# Patient Record
Sex: FEMALE | Race: WHITE | NOT HISPANIC OR LATINO | Employment: OTHER | URBAN - NONMETROPOLITAN AREA
[De-identification: names, ages, dates, MRNs, and addresses within clinical notes are randomized per-mention and may not be internally consistent; named-entity substitution may affect disease eponyms.]

---

## 2017-06-12 ENCOUNTER — HISTORY (OUTPATIENT)
Dept: RADIOLOGY | Facility: OTHER | Age: 62
End: 2017-06-12

## 2017-06-12 ENCOUNTER — HOSPITAL ENCOUNTER (OUTPATIENT)
Dept: RADIOLOGY | Facility: OTHER | Age: 62
End: 2017-06-12
Attending: FAMILY MEDICINE

## 2017-06-12 DIAGNOSIS — Z12.31 ENCOUNTER FOR SCREENING MAMMOGRAM FOR MALIGNANT NEOPLASM OF BREAST: ICD-10-CM

## 2017-12-27 NOTE — PROGRESS NOTES
Patient Information     Patient Name MRN Sex Margi Ventura [id number] Female 1955      Progress Notes by Ilsa Johnson at 2017  9:15 AM     Author:  Ilsa Johnson Service:  (none) Author Type:  (none)     Filed:  2017  9:16 AM Date of Service:  2017  9:15 AM Status:  Signed     :  Ilsa Johnson            Falls Risk Criteria:    Age 65 and older or under age 4        Sensory deficits    Poor vision    Use of ambulatory aides    Impaired judgment    Unable to walk independently    Meets High Risk criteria for falls:  no

## 2018-02-27 ENCOUNTER — DOCUMENTATION ONLY (OUTPATIENT)
Dept: FAMILY MEDICINE | Facility: OTHER | Age: 63
End: 2018-02-27

## 2018-02-27 PROBLEM — Z83.3 FAMILY HISTORY OF DIABETES MELLITUS: Status: ACTIVE | Noted: 2018-02-27

## 2018-02-27 PROBLEM — M79.609 PAIN IN LIMB: Status: ACTIVE | Noted: 2018-02-27

## 2018-06-28 ENCOUNTER — HOSPITAL ENCOUNTER (OUTPATIENT)
Dept: MAMMOGRAPHY | Facility: OTHER | Age: 63
Discharge: HOME | End: 2018-06-28
Attending: FAMILY MEDICINE | Admitting: FAMILY MEDICINE
Payer: OTHER GOVERNMENT

## 2018-06-28 DIAGNOSIS — Z12.39 SCREENING BREAST EXAMINATION: ICD-10-CM

## 2018-06-28 DIAGNOSIS — Z12.31 VISIT FOR SCREENING MAMMOGRAM: ICD-10-CM

## 2018-06-28 DIAGNOSIS — Z00.00 PREVENTATIVE HEALTH CARE: ICD-10-CM

## 2018-06-28 PROCEDURE — 77067 SCR MAMMO BI INCL CAD: CPT

## 2019-07-15 ENCOUNTER — HOSPITAL ENCOUNTER (OUTPATIENT)
Dept: MAMMOGRAPHY | Facility: OTHER | Age: 64
Discharge: HOME | End: 2019-07-15
Attending: FAMILY MEDICINE | Admitting: FAMILY MEDICINE
Payer: MEDICARE

## 2019-07-15 DIAGNOSIS — Z12.31 VISIT FOR SCREENING MAMMOGRAM: ICD-10-CM

## 2019-07-15 PROCEDURE — 77063 BREAST TOMOSYNTHESIS BI: CPT

## 2020-02-21 NOTE — PROGRESS NOTES
Nursing Notes:   Suzy Muro LPN  2020  9:42 AM  Signed  Patient presents to the clinic today to discuss a colonoscopy.     Med rec complete.  Suzy Bhaskar HOLT.................. 2020 9:20 AM      SUBJECTIVE:   CC:  Margi Fiore is a 64 year old female who presents to clinic today for the following health issues:  Colon cancer screening.    HPI  Margi Fiore is a 64 year old female who presents for discussion of colon cancer screening.  She has not ever had a colonoscopy done.  She states in the past, her insurance would send her Hemoccult cards, these were always negative.  She has a history of constipation.  She used to take magnesium citrate about every other day.  Now takes a powder laxative about every other day.  She doesn't get gassy or bloated.       No Known Allergies  Current Outpatient Medications   Medication     Thyroid (NATURE-THROID) 48.75 MG TABS     No current facility-administered medications for this visit.       Past Medical History:   Diagnosis Date     Breast lump     FNA benign breast mass age early 40's     Hypothyroidism     2014     Personal history of other medical treatment (CODE)      SAB x 3 EAB x 1      History reviewed. No pertinent surgical history.  Family History   Problem Relation Age of Onset     Hypertension Father         Hypertension     Hyperlipidemia Father         Hyperlipidemia,Hyperlipidemia     Heart Disease Father 86        Heart Disease,MI with stents     Other - See Comments Mother         Lung disease     Hypertension Brother         Hypertension     Family History Negative Sister         Good Health     Diabetes Other         Diabetes,Family history - father's side     Breast Cancer No family hx of         Cancer-breast       Review of Systems She wonders about shingles vaccine ; she is not interested in other healthcare maintenance follow-up at this time.  According to her chart, she is on thyroid replacement.    PHQ-2 Score:  "    PHQ-2 ( 1999 Pfizer) 2/24/2020   Q1: Little interest or pleasure in doing things 0   Q2: Feeling down, depressed or hopeless 0   PHQ-2 Score 0         No flowsheet data found.      OBJECTIVE:     /78   Pulse 68   Temp 97.8  F (36.6  C) (Tympanic)   Resp 18   Ht 1.626 m (5' 4\")   Breastfeeding No   BMI 22.66 kg/m    Body mass index is 22.66 kg/m .   Wt Readings from Last 3 Encounters:   06/10/16 59.9 kg (132 lb)   02/11/14 61.2 kg (135 lb)       Physical Exam  Vitals signs and nursing note reviewed.   Constitutional:       Appearance: Normal appearance. She is normal weight.   Neurological:      Mental Status: She is alert.          No results found for any visits on 02/24/20.      ASSESSMENT/PLAN:       ICD-10-CM    1. Screening for colon cancer Z12.11 ABSTRACT COLOGUARD-NO CHARGE            PLAN:  1.  We discussed options related to colon cancer screening.  In discussing Cologuard, she is very interested in pursuing this.  Order for this is placed today.  She is aware that if positive, a screening colonoscopy would then be indicated.  If negative, follow-up screening in 3 years.  2.  Discussed Medicare covered services including vaccinations.      LILIAN AZAR MD  [facility] AND [facility]    This note was created using voice recognition software and was screened for errors in transcription.    "

## 2020-02-24 ENCOUNTER — OFFICE VISIT (OUTPATIENT)
Dept: FAMILY MEDICINE | Facility: OTHER | Age: 65
End: 2020-02-24
Attending: FAMILY MEDICINE
Payer: MEDICARE

## 2020-02-24 VITALS
HEIGHT: 64 IN | TEMPERATURE: 97.8 F | SYSTOLIC BLOOD PRESSURE: 130 MMHG | RESPIRATION RATE: 18 BRPM | HEART RATE: 68 BPM | DIASTOLIC BLOOD PRESSURE: 78 MMHG | BODY MASS INDEX: 22.66 KG/M2

## 2020-02-24 DIAGNOSIS — Z12.11 SCREENING FOR COLON CANCER: Primary | ICD-10-CM

## 2020-02-24 PROCEDURE — G0463 HOSPITAL OUTPT CLINIC VISIT: HCPCS

## 2020-02-24 PROCEDURE — 99213 OFFICE O/P EST LOW 20 MIN: CPT | Performed by: FAMILY MEDICINE

## 2020-02-24 ASSESSMENT — PAIN SCALES - GENERAL: PAINLEVEL: NO PAIN (0)

## 2020-02-24 NOTE — NURSING NOTE
Patient presents to the clinic today to discuss a colonoscopy.     Med rec complete.  Suzy Muro LPN.................. 2/24/2020 9:20 AM

## 2020-02-24 NOTE — PATIENT INSTRUCTIONS
Services Typically covered by Medicare Recommended Completed   Vaccines    Pneumonoccol    Influenza    Hepatitis B (if medium/high risk)     Once for patients after age 65    Yearly  Medium/high risk factors:    End Stage Kidney Disease    Hemophiliacs who received Factor XIII or IX concentrates    Clients of institutions for developmentally disabled    Persons who live in same house as a Hepatitis B carrier    Homosexual men    Illicit injectable drug users    Health care workers     Mammogram Covered: One-time screen between age 35-39, annually for age 40+     Pap and Pelvic Exam Covered: Annually if  high risk,  or childbearing age with abnormal Pap in last 3 years.  Q24 months for all other women     Prostate Cancer Screening    Digital rectal exam    PSA Covered: Annually for all men > age 50     Corolrectal Cancer Screening Screening colonoscopy every 10 years, more often for high risk patients     Diabetes Self-Management Training Requires referral by treating physician for patient with diabetes     Diabetes Screening    Fasting blood sugar or glucose tolerance test   Once yearly, twice yearly if prediabetic     Cardiovascular Screening Blood Tests    Total Cholesterol    HDL    Triglycerides Every 5 years     Medical Nutrition Therapy for Diabetes or Renal Disease Requires referral by treating physician for patient with diabetes or kidney disease     Glaucoma Screening Annually for patients with one of the following risk factors:    Diabetes Mellitus    Family history of Glaucoma    -American age 50 and over    -American age 65 and over     Bone Mass Measurement Every 24 months if one of the following risk factors:    Estrogen deficiency    Vertebral abnormalities on x-ray indicative of Osteoporosis, Osteopenia, or Vertebral fracture    Receiving/expected to receive the equivalent of at least 5 mg of Prednisone per day for > 3 months    Hyperparathyroidism    Patient being monitored for  response to Osteoporosis Therapy     One-time AAA screen  Must be ordered as part of Medicare IPPE   Any patient with a family history of AAA    Males Age 65-75, with history of smoking at least 100 cigarettes in lifetime     Smoking Cessation Counseling Beneficiaries who use tobacco are eligible to receive 2 cessation attempts per year; each attempt includes maximum of 4 sessions     HIV Screening Annually for beneficiaries at increased risk:       Increased risk for HIV infection is defined in the  National Coverage Determinations (NCD) Manual,  Publication 100-03 Sections 190.14 (diagnostic) and 210.7 (screening). See http://www.cms.gov/manuals/downloads/wly802k9_Qhoq9.pdf and http://www.cms.gov/manuals/downloads/rnv990x8_Asjd3.pdf on the Internet.  Three times per pregnancy for beneficiaries who are pregnant.     Future Annual Wellness Visit Annually, for all beneficiaries.

## 2020-03-11 ENCOUNTER — HEALTH MAINTENANCE LETTER (OUTPATIENT)
Age: 65
End: 2020-03-11

## 2020-03-18 LAB — COLOGUARD-ABSTRACT: NEGATIVE

## 2020-07-13 ENCOUNTER — TELEPHONE (OUTPATIENT)
Dept: FAMILY MEDICINE | Facility: OTHER | Age: 65
End: 2020-07-13

## 2020-07-13 DIAGNOSIS — Z78.0 POSTMENOPAUSAL STATUS: Primary | ICD-10-CM

## 2020-07-13 NOTE — TELEPHONE ENCOUNTER
PCJ-Pt wants a bone density test. Can orders be done over the phone or is a visit required. Please call. Thank you.  Nati Dorado

## 2020-07-24 ENCOUNTER — HOSPITAL ENCOUNTER (OUTPATIENT)
Dept: MAMMOGRAPHY | Facility: OTHER | Age: 65
Discharge: HOME | End: 2020-07-24
Attending: FAMILY MEDICINE | Admitting: FAMILY MEDICINE
Payer: MEDICARE

## 2020-07-24 DIAGNOSIS — Z12.31 VISIT FOR SCREENING MAMMOGRAM: ICD-10-CM

## 2020-07-24 PROCEDURE — 77063 BREAST TOMOSYNTHESIS BI: CPT

## 2020-08-05 ENCOUNTER — HOSPITAL ENCOUNTER (OUTPATIENT)
Dept: MAMMOGRAPHY | Facility: OTHER | Age: 65
End: 2020-08-05
Attending: FAMILY MEDICINE
Payer: MEDICARE

## 2020-08-05 DIAGNOSIS — R92.8 ABNORMAL FINDING ON BREAST IMAGING: ICD-10-CM

## 2020-08-05 PROCEDURE — 77066 DX MAMMO INCL CAD BI: CPT

## 2020-08-17 ENCOUNTER — HOSPITAL ENCOUNTER (OUTPATIENT)
Dept: BONE DENSITY | Facility: OTHER | Age: 65
Discharge: HOME | End: 2020-08-17
Attending: FAMILY MEDICINE | Admitting: FAMILY MEDICINE
Payer: MEDICARE

## 2020-08-17 DIAGNOSIS — Z78.0 POSTMENOPAUSAL STATUS: ICD-10-CM

## 2020-08-17 PROBLEM — M85.80 OSTEOPENIA: Status: ACTIVE | Noted: 2020-01-01

## 2020-08-17 PROCEDURE — 77080 DXA BONE DENSITY AXIAL: CPT

## 2020-08-20 ENCOUNTER — MYC MEDICAL ADVICE (OUTPATIENT)
Dept: FAMILY MEDICINE | Facility: OTHER | Age: 65
End: 2020-08-20

## 2021-01-03 ENCOUNTER — HEALTH MAINTENANCE LETTER (OUTPATIENT)
Age: 66
End: 2021-01-03

## 2021-01-13 ENCOUNTER — MYC MEDICAL ADVICE (OUTPATIENT)
Dept: FAMILY MEDICINE | Facility: OTHER | Age: 66
End: 2021-01-13

## 2021-01-15 ENCOUNTER — MYC MEDICAL ADVICE (OUTPATIENT)
Dept: FAMILY MEDICINE | Facility: OTHER | Age: 66
End: 2021-01-15

## 2021-02-27 ENCOUNTER — MYC MEDICAL ADVICE (OUTPATIENT)
Dept: FAMILY MEDICINE | Facility: OTHER | Age: 66
End: 2021-02-27

## 2021-02-27 DIAGNOSIS — Z83.719 FAMILY HISTORY OF COLONIC POLYPS: Primary | ICD-10-CM

## 2021-02-27 NOTE — TELEPHONE ENCOUNTER
Please call patient:  She had negative cologuard testing March 2020.  Follow up colon cancer testing is not due until 2023.  If she is having a change in bowel symptoms, she should be seen first.  Yulia Lopez MD

## 2021-02-27 NOTE — TELEPHONE ENCOUNTER
After proper verification writer spoke with patient who understands that she did receive the negative cologuard but has worries because her father recently had a colonoscopy which included 9 polyps and had a large area of his bowels removed. She states his surgeon recommended she get one. She has not noticed any changes in bowel habits.   Chiquita Lee LPN on 2/27/2021 at 1:53 PM

## 2021-03-01 DIAGNOSIS — Z12.11 ENCOUNTER FOR SCREENING COLONOSCOPY: Primary | ICD-10-CM

## 2021-03-01 RX ORDER — BISACODYL 5 MG/1
TABLET, DELAYED RELEASE ORAL
Qty: 2 TABLET | Refills: 0 | Status: ON HOLD | OUTPATIENT
Start: 2021-03-01 | End: 2021-04-22

## 2021-03-01 RX ORDER — POLYETHYLENE GLYCOL 3350 17 G/17G
POWDER, FOR SOLUTION ORAL
Qty: 510 G | Refills: 0 | Status: ON HOLD | OUTPATIENT
Start: 2021-03-01 | End: 2021-04-22

## 2021-03-01 RX ORDER — BISACODYL 5 MG/1
TABLET, DELAYED RELEASE ORAL
Qty: 2 TABLET | Refills: 0 | Status: CANCELLED | OUTPATIENT
Start: 2021-03-01

## 2021-03-01 RX ORDER — POLYETHYLENE GLYCOL 3350 17 G/17G
POWDER, FOR SOLUTION ORAL
Qty: 255 G | Refills: 0 | Status: CANCELLED | OUTPATIENT
Start: 2021-03-01

## 2021-03-01 NOTE — TELEPHONE ENCOUNTER
Screening Questions for the Scheduling of Screening Colonoscopies   (If Colonoscopy is diagnostic, Provider should review the chart before scheduling.)  Are you younger than 50 or older than 80?  NO  Do you take aspirin or fish oil?  YES (if yes, tell patient to stop 1 week prior to Colonoscopy)  Do you take warfarin (Coumadin), clopidogrel (Plavix), apixaban (Eliquis), dabigatram (Pradaxa), rivaroxaban (Xarelto) or any blood thinner? NO  Do you use oxygen at home?  NO  Do you have kidney disease? NO  Are you on dialysis? NO  Have you had a stroke or heart attack in the last year? NO  Have you had a stent in your heart or any blood vessel in the last year? NO  Have you had a transplant of any organ? NO  Have you had a colonoscopy or upper endoscopy (EGD) before? NO         When?    Date of scheduled Colonoscopy. 04/22/2021  Provider DR. TELLO  Pharmacy [facility]

## 2021-04-18 ENCOUNTER — ALLIED HEALTH/NURSE VISIT (OUTPATIENT)
Dept: FAMILY MEDICINE | Facility: OTHER | Age: 66
End: 2021-04-18
Attending: SURGERY
Payer: MEDICARE

## 2021-04-18 DIAGNOSIS — Z12.11 ENCOUNTER FOR SCREENING COLONOSCOPY: ICD-10-CM

## 2021-04-18 LAB
LABORATORY COMMENT REPORT: NORMAL
SARS-COV-2 RNA RESP QL NAA+PROBE: NEGATIVE
SARS-COV-2 RNA RESP QL NAA+PROBE: NORMAL
SPECIMEN SOURCE: NORMAL
SPECIMEN SOURCE: NORMAL

## 2021-04-18 PROCEDURE — U0005 INFEC AGEN DETEC AMPLI PROBE: HCPCS | Mod: ZL | Performed by: SURGERY

## 2021-04-18 PROCEDURE — U0003 INFECTIOUS AGENT DETECTION BY NUCLEIC ACID (DNA OR RNA); SEVERE ACUTE RESPIRATORY SYNDROME CORONAVIRUS 2 (SARS-COV-2) (CORONAVIRUS DISEASE [COVID-19]), AMPLIFIED PROBE TECHNIQUE, MAKING USE OF HIGH THROUGHPUT TECHNOLOGIES AS DESCRIBED BY CMS-2020-01-R: HCPCS | Mod: ZL | Performed by: SURGERY

## 2021-04-18 PROCEDURE — C9803 HOPD COVID-19 SPEC COLLECT: HCPCS

## 2021-04-22 ENCOUNTER — HOSPITAL ENCOUNTER (OUTPATIENT)
Facility: OTHER | Age: 66
Discharge: HOME | End: 2021-04-22
Attending: SURGERY | Admitting: SURGERY
Payer: MEDICARE

## 2021-04-22 ENCOUNTER — ANESTHESIA EVENT (OUTPATIENT)
Dept: SURGERY | Facility: OTHER | Age: 66
End: 2021-04-22
Payer: MEDICARE

## 2021-04-22 ENCOUNTER — ANESTHESIA (OUTPATIENT)
Dept: SURGERY | Facility: OTHER | Age: 66
End: 2021-04-22
Payer: MEDICARE

## 2021-04-22 VITALS
OXYGEN SATURATION: 96 % | WEIGHT: 132 LBS | TEMPERATURE: 98.2 F | SYSTOLIC BLOOD PRESSURE: 121 MMHG | DIASTOLIC BLOOD PRESSURE: 77 MMHG | BODY MASS INDEX: 22.53 KG/M2 | HEIGHT: 64 IN | HEART RATE: 68 BPM

## 2021-04-22 PROCEDURE — 88305 TISSUE EXAM BY PATHOLOGIST: CPT

## 2021-04-22 PROCEDURE — 250N000009 HC RX 250: Performed by: SURGERY

## 2021-04-22 PROCEDURE — 45385 COLONOSCOPY W/LESION REMOVAL: CPT | Performed by: NURSE ANESTHETIST, CERTIFIED REGISTERED

## 2021-04-22 PROCEDURE — 45385 COLONOSCOPY W/LESION REMOVAL: CPT | Mod: PT | Performed by: SURGERY

## 2021-04-22 PROCEDURE — 45380 COLONOSCOPY AND BIOPSY: CPT | Performed by: SURGERY

## 2021-04-22 PROCEDURE — 250N000011 HC RX IP 250 OP 636: Performed by: NURSE ANESTHETIST, CERTIFIED REGISTERED

## 2021-04-22 PROCEDURE — 258N000003 HC RX IP 258 OP 636: Performed by: SURGERY

## 2021-04-22 PROCEDURE — 250N000009 HC RX 250: Performed by: NURSE ANESTHETIST, CERTIFIED REGISTERED

## 2021-04-22 RX ORDER — PROPOFOL 10 MG/ML
INJECTION, EMULSION INTRAVENOUS CONTINUOUS PRN
Status: DISCONTINUED | OUTPATIENT
Start: 2021-04-22 | End: 2021-04-22

## 2021-04-22 RX ORDER — LIDOCAINE 40 MG/G
CREAM TOPICAL
Status: DISCONTINUED | OUTPATIENT
Start: 2021-04-22 | End: 2021-04-22 | Stop reason: HOSPADM

## 2021-04-22 RX ORDER — NALOXONE HYDROCHLORIDE 0.4 MG/ML
0.4 INJECTION, SOLUTION INTRAMUSCULAR; INTRAVENOUS; SUBCUTANEOUS
Status: DISCONTINUED | OUTPATIENT
Start: 2021-04-22 | End: 2021-04-22 | Stop reason: HOSPADM

## 2021-04-22 RX ORDER — FLUMAZENIL 0.1 MG/ML
0.2 INJECTION, SOLUTION INTRAVENOUS
Status: DISCONTINUED | OUTPATIENT
Start: 2021-04-22 | End: 2021-04-22 | Stop reason: HOSPADM

## 2021-04-22 RX ORDER — NALOXONE HYDROCHLORIDE 0.4 MG/ML
0.2 INJECTION, SOLUTION INTRAMUSCULAR; INTRAVENOUS; SUBCUTANEOUS
Status: DISCONTINUED | OUTPATIENT
Start: 2021-04-22 | End: 2021-04-22 | Stop reason: HOSPADM

## 2021-04-22 RX ORDER — PROPOFOL 10 MG/ML
INJECTION, EMULSION INTRAVENOUS PRN
Status: DISCONTINUED | OUTPATIENT
Start: 2021-04-22 | End: 2021-04-22

## 2021-04-22 RX ORDER — SODIUM CHLORIDE, SODIUM LACTATE, POTASSIUM CHLORIDE, CALCIUM CHLORIDE 600; 310; 30; 20 MG/100ML; MG/100ML; MG/100ML; MG/100ML
INJECTION, SOLUTION INTRAVENOUS CONTINUOUS
Status: DISCONTINUED | OUTPATIENT
Start: 2021-04-22 | End: 2021-04-22 | Stop reason: HOSPADM

## 2021-04-22 RX ORDER — ONDANSETRON 2 MG/ML
4 INJECTION INTRAMUSCULAR; INTRAVENOUS
Status: DISCONTINUED | OUTPATIENT
Start: 2021-04-22 | End: 2021-04-22 | Stop reason: HOSPADM

## 2021-04-22 RX ORDER — LIDOCAINE HYDROCHLORIDE 20 MG/ML
INJECTION, SOLUTION INFILTRATION; PERINEURAL PRN
Status: DISCONTINUED | OUTPATIENT
Start: 2021-04-22 | End: 2021-04-22

## 2021-04-22 RX ADMIN — PROPOFOL 140 MCG/KG/MIN: 10 INJECTION, EMULSION INTRAVENOUS at 08:15

## 2021-04-22 RX ADMIN — PROPOFOL 100 MG: 10 INJECTION, EMULSION INTRAVENOUS at 08:15

## 2021-04-22 RX ADMIN — SODIUM CHLORIDE, POTASSIUM CHLORIDE, SODIUM LACTATE AND CALCIUM CHLORIDE: 600; 310; 30; 20 INJECTION, SOLUTION INTRAVENOUS at 08:00

## 2021-04-22 RX ADMIN — LIDOCAINE HYDROCHLORIDE 40 MG: 20 INJECTION, SOLUTION INFILTRATION; PERINEURAL at 08:15

## 2021-04-22 ASSESSMENT — MIFFLIN-ST. JEOR: SCORE: 1123.75

## 2021-04-22 NOTE — ANESTHESIA POSTPROCEDURE EVALUATION
Patient: Margi Fiore    Procedure(s):  COLONOSCOPY, WITH POLYPECTOMY    Diagnosis:History of colon polyps [Z86.010]  Diagnosis Additional Information: No value filed.    Anesthesia Type:  MAC    Note:  Disposition: Outpatient   Postop Pain Control: Uneventful            Sign Out: Well controlled pain   PONV: No   Neuro/Psych: Uneventful            Sign Out: Acceptable/Baseline neuro status   Airway/Respiratory: Uneventful            Sign Out: Acceptable/Baseline resp. status   CV/Hemodynamics: Uneventful            Sign Out: Acceptable CV status; No obvious hypovolemia; No obvious fluid overload   Other NRE: NONE   DID A NON-ROUTINE EVENT OCCUR? No           Last vitals:  Vitals:    04/22/21 0849 04/22/21 0900 04/22/21 0915   BP: 95/62 101/61 121/77   Pulse: 66 62 68   Temp:      SpO2:  97% 96%       Last vitals prior to Anesthesia Care Transfer:  CRNA VITALS  4/22/2021 0819 - 4/22/2021 0919      4/22/2021             Resp Rate (set):  10          Electronically Signed By: EDWARD Kuhn CRNA  April 22, 2021  10:09 AM

## 2021-04-22 NOTE — OP NOTE
PROCEDURE NOTE    SURGEON:Dominguez Beltran MD    PRE-OP DIAGNOSIS:  Screening Colonoscopy      POST-OP DIAGNOSIS: Colon polyps, diverticuli    PROCEDURE:  Colonoscopy with cold snare    SPECIMEN:    ID Type Source Tests Collected by Time Destination   A :  Polyp Large Intestine, Right/Ascending SURGICAL PATHOLOGY EXAM Dominguez Beltran MD 4/22/2021  8:28 AM    B : sigmoid colon polyp Polyp Large Intestine, Sigmoid SURGICAL PATHOLOGY EXAM Dominguez Beltran MD 4/22/2021  8:37 AM          ANESTHESIA:  Monitor Anesthesia Care CRNA Independent: Fransisco Morrell APRN CRNA   Coverage requested    ESTIMATED BLOOD LOSS: none    COMPLICATIONS:  None    INDICATION FOR THE PROCEDURE: The patient is a 65 year old female. The patient presents with family hx of colon cancer. I explained to the patient the risks, benefits and alternatives to screening colonoscopy for evaluating for cancer or polyps. We discussed the risks including bleeding, perforation, potential inability to reach the cecum and the risks of sedation. The patient's questions were answered and the patient wished to proceed. Informed consent paperwork was completed.    PROCEDURE: The patient was taken to the endoscopy suite. Appropriate monitors were attached. The patient was placed in the left lateral decubitus position. Timeout was performed confirming the patient's identity and procedure to be performed.  After appropriate sedation was confirmed, digital rectal exam was performed.  There was normal tone and no gross abnormality was noted.  The lubricated colonoscope was introduced into the anus the colon was insufflated with air. The prep quality was adequate. Under direct visualization the scope was advanced to the cecum. The mucosa of the colon was inspected while withdrawing the scope. A 6 mm ascending colon polyp and two 5 mm sigmoid colon polyps were removed with cold snare.  The scope was retroflexed in the rectum and the anorectal junction was inspected. No  abnormalities were noted. The scope was returned to aneutral position and the colon was decompressed. The scope was removed. The patient tolerated the procedure with no immediately apparent complication. The patient was taken to recovery in stable condition.    FOLLOW UP: RECOMMEND high fiber diet, will call with pathology results.     Dominguez Beltran MD on 4/22/2021 at 8:44 AM

## 2021-04-22 NOTE — ANESTHESIA PREPROCEDURE EVALUATION
Anesthesia Pre-Procedure Evaluation    Patient: Margi Fiore   MRN: [id number] : 1955        Preoperative Diagnosis: History of colon polyps [Z86.010]   Procedure : Procedure(s):  COLONOSCOPY     Past Medical History:   Diagnosis Date     Breast lump     FNA benign breast mass age early 40's     Hypothyroidism     2014     Osteopenia 2020    Dexa      Personal history of other medical treatment (CODE)      SAB x 3 EAB x 1      History reviewed. No pertinent surgical history.   No Known Allergies   Social History     Tobacco Use     Smoking status: Never Smoker     Smokeless tobacco: Never Used   Substance Use Topics     Alcohol use: No      Wt Readings from Last 1 Encounters:   21 59.9 kg (132 lb)        Anesthesia Evaluation   Pt has had prior anesthetic.     No history of anesthetic complications       ROS/MED HX  ENT/Pulmonary:  - neg pulmonary ROS     Neurologic:  - neg neurologic ROS     Cardiovascular:  - neg cardiovascular ROS     METS/Exercise Tolerance: >4 METS    Hematologic:  - neg hematologic  ROS     Musculoskeletal:  - neg musculoskeletal ROS     GI/Hepatic:  - neg GI/hepatic ROS     Renal/Genitourinary:  - neg Renal ROS     Endo:     (+) thyroid problem, hypothyroidism,     Psychiatric/Substance Use:  - neg psychiatric ROS     Infectious Disease:  - neg infectious disease ROS     Malignancy:  - neg malignancy ROS     Other:  - neg other ROS          Physical Exam    Airway        Mallampati: I   TM distance: > 3 FB   Neck ROM: full   Mouth opening: > 3 cm    Respiratory Devices and Support         Dental  no notable dental history         Cardiovascular   cardiovascular exam normal       Rhythm and rate: regular and normal     Pulmonary   pulmonary exam normal        breath sounds clear to auscultation           OUTSIDE LABS:  CBC: No results found for: WBC, HGB, HCT, PLT  BMP: No results found for: NA, POTASSIUM, CHLORIDE, CO2, BUN, CR, GLC  COAGS: No results found  for: PTT, INR, FIBR  POC: No results found for: BGM, HCG, HCGS  HEPATIC: No results found for: ALBUMIN, PROTTOTAL, ALT, AST, GGT, ALKPHOS, BILITOTAL, BILIDIRECT, KHADRA  OTHER: No results found for: PH, LACT, A1C, EFRAIN, PHOS, MAG, LIPASE, AMYLASE, TSH, T4, T3, CRP, SED    Anesthesia Plan    ASA Status:  2   NPO Status:  NPO Appropriate    Anesthesia Type: MAC.   Induction: Intravenous.           Consents    Anesthesia Plan(s) and associated risks, benefits, and realistic alternatives discussed. Questions answered and patient/representative(s) expressed understanding.     - Discussed with:  Patient         Postoperative Care            Comments:                EDWARD LUA CRNA

## 2021-04-22 NOTE — OR NURSING
patient has been discharged to home at 0935 via ambulatory accompanied by dad    Written discharge instructions were provided to patient.  Prescriptions were none.      Patient and adult caring for them verbalize understanding of discharge instructions including no driving until tomorrow and no longer taking narcotic pain medications - no operating mechanical equipment and no making any important decisions.They understand reason for discharge, and necessary follow-up appointments.

## 2021-04-22 NOTE — DISCHARGE INSTRUCTIONS
[facility]  Adult Discharge Orders & Instructions    ________________________________________________________________          For 12 hours after surgery  1. Get plenty of rest.  A responsible adult must stay with you for at least 12 hours after you leave the hospital.   2. You may feel lightheaded.  IF so, sit for a few minutes before standing.  Have someone help you get up.   3. You may have a slight fever. Call the doctor if your fever is over 101 F (38.3 C) (taken under the tongue) or lasts longer than 24 hours.  4. You may have a dry mouth, a sore throat, muscle aches or trouble sleeping.  These should go away after 24 hours.  5. Do not make important or legal decisions.  6.   Do not drive or use heavy equipment.  If you have weakness or tingling, don't drive or use heavy equipment until this feeling goes away.    To contact a doctor, call   [phone]_______________________

## 2021-04-22 NOTE — ANESTHESIA CARE TRANSFER NOTE
Patient: Margi Fiore    Procedure(s):  COLONOSCOPY, WITH POLYPECTOMY    Diagnosis: History of colon polyps [Z86.010]  Diagnosis Additional Information: No value filed.    Anesthesia Type:   MAC     Note:    Oropharynx: oropharynx clear of all foreign objects  Level of Consciousness: drowsy  Oxygen Supplementation: nasal cannula  Level of Supplemental Oxygen (L/min / FiO2): 2  Independent Airway: airway patency satisfactory and stable  Dentition: dentition unchanged  Vital Signs Stable: post-procedure vital signs reviewed and stable  Report to RN Given: handoff report given  Patient transferred to: Phase II    Handoff Report: Identifed the Patient, Identified the Reponsible Provider, Reviewed the pertinent medical history, Discussed the surgical course, Reviewed Intra-OP anesthesia mangement and issues during anesthesia, Set expectations for post-procedure period and Allowed opportunity for questions and acknowledgement of understanding      Vitals: (Last set prior to Anesthesia Care Transfer)  CRNA VITALS  4/22/2021 0819 - 4/22/2021 0849      4/22/2021             Resp Rate (set):  10        Electronically Signed By: EDWARD Kuhn CRNA  April 22, 2021  8:49 AM

## 2021-04-22 NOTE — H&P
PRE-PROCEDURE NOTE    CHIEFCOMPLAINT / REASON FOR PROCEDURE:  Screening for polyps and colorectal cancer.    PERTINENT HISTORY   Patient is due for colonoscopy. Hx of polyps.    Past Medical History:   Diagnosis Date     Breast lump     FNA benign breast mass age early 40's     Hypothyroidism     2014     Osteopenia     Dexa      Personal history of other medical treatment (CODE)      SAB x 3 EAB x 1       History reviewed. No pertinent surgical history.      Other:  None  Bleeding tendencies: No     Relevant Family History:  None     Relevant Social History:  None     10 point ROS of systems including Constitutional, Eyes, Respiratory, Cardiovascular, Gastroenterology, Genitourinary, Integumentary, Muscularskeletal, Psychiatric were all negative except for pertinent positives noted in my HPI.      ALLERGIES/SENSITIVITIES: No Known Allergies     CURRENT MEDICATIONS:    No current facility-administered medications on file prior to encounter.   NATURE-THROID 65 MG tablet,   Thyroid (NATURE-THROID) 48.75 MG TABS, Take 1 tablet by mouth daily            PRE-SEDATION ASSESSMENT:    LUNGS:  CTA B/L, no wheezing or crackles.  Heart & CV:  RRR no murmur.  Intact distal pulses, good cap refill.    Comment(s):      IMPRESSION: 65 year old female in need of screening colonoscopy.    PLAN:  I discussed screening colonoscopy with the patient. Anesthesia coverage requested.    Dominguez Beltran MD    2021 8:00 AM

## 2021-04-25 ENCOUNTER — HEALTH MAINTENANCE LETTER (OUTPATIENT)
Age: 66
End: 2021-04-25

## 2021-07-26 ENCOUNTER — HOSPITAL ENCOUNTER (OUTPATIENT)
Dept: MAMMOGRAPHY | Facility: OTHER | Age: 66
Discharge: HOME | End: 2021-07-26
Attending: FAMILY MEDICINE | Admitting: FAMILY MEDICINE
Payer: MEDICARE

## 2021-07-26 DIAGNOSIS — Z12.31 VISIT FOR SCREENING MAMMOGRAM: ICD-10-CM

## 2021-07-26 PROCEDURE — 77063 BREAST TOMOSYNTHESIS BI: CPT

## 2021-10-09 ENCOUNTER — HEALTH MAINTENANCE LETTER (OUTPATIENT)
Age: 66
End: 2021-10-09

## 2021-10-10 ENCOUNTER — MYC MEDICAL ADVICE (OUTPATIENT)
Dept: FAMILY MEDICINE | Facility: OTHER | Age: 66
End: 2021-10-10

## 2022-02-07 ENCOUNTER — OFFICE VISIT (OUTPATIENT)
Dept: FAMILY MEDICINE | Facility: OTHER | Age: 67
End: 2022-02-07
Attending: FAMILY MEDICINE
Payer: MEDICARE

## 2022-02-07 VITALS
OXYGEN SATURATION: 98 % | RESPIRATION RATE: 16 BRPM | DIASTOLIC BLOOD PRESSURE: 80 MMHG | TEMPERATURE: 98.7 F | SYSTOLIC BLOOD PRESSURE: 132 MMHG | HEART RATE: 75 BPM

## 2022-02-07 DIAGNOSIS — B02.9 HERPES ZOSTER WITHOUT COMPLICATION: Primary | ICD-10-CM

## 2022-02-07 PROCEDURE — 99213 OFFICE O/P EST LOW 20 MIN: CPT | Performed by: FAMILY MEDICINE

## 2022-02-07 PROCEDURE — G0463 HOSPITAL OUTPT CLINIC VISIT: HCPCS

## 2022-02-07 RX ORDER — ASPIRIN 81 MG/1
81 TABLET ORAL DAILY
COMMUNITY

## 2022-02-07 RX ORDER — VALACYCLOVIR HYDROCHLORIDE 1 G/1
1000 TABLET, FILM COATED ORAL 3 TIMES DAILY
Qty: 21 TABLET | Refills: 0 | Status: SHIPPED | OUTPATIENT
Start: 2022-02-07 | End: 2022-03-13

## 2022-02-07 ASSESSMENT — PAIN SCALES - GENERAL: PAINLEVEL: NO PAIN (0)

## 2022-02-07 NOTE — NURSING NOTE
"Chief Complaint   Patient presents with     Derm Problem     Patient is here for a rash- possible shingles     Initial /80 (BP Location: Right arm, Patient Position: Sitting, Cuff Size: Adult Regular)   Pulse 75   Temp 98.7  F (37.1  C) (Tympanic)   Resp 16   LMP  (LMP Unknown)   SpO2 98%   Breastfeeding No  Estimated body mass index is 22.66 kg/m  as calculated from the following:    Height as of 4/22/21: 1.626 m (5' 4\").    Weight as of 4/22/21: 59.9 kg (132 lb).  Medication Reconciliation: complete    Agustina Knowles MA       FOOD SECURITY SCREENING QUESTIONS:    The next two questions are to help us understand your food security.  If you are feeling you need any assistance in this area, we have resources available to support you today.    Hunger Vital Signs:  Within the past 12 months we worried whether our food would run out before we got money to buy more. Never  Within the past 12 months the food we bought just didn't last and we didn't have money to get more. Never  Agustina Knowles MA,LPN on 2/7/2022 at 3:58 PM      "

## 2022-02-07 NOTE — PROGRESS NOTES
"ASSESSMENT/PLAN:     1. Herpes zoster without complication      1. Patient with classic herpes zoster rash. She is aware that there are antivirals that she can start taking at this time and she would like to proceed with this. Prescription for Valtrex 500 mg 3 times daily x7 days is sent into her pharmacy. We discussed over-the-counter analgesics, possible topical agents I can help decrease/manage symptoms as well. Discussed risk factors for postherpetic neuralgia. We also discussed contagious nature of outbreak, in particular to whom she is most likely to be contagious.    Follow-up as needed    Assessment & Plan   Problem List Items Addressed This Visit     None      Visit Diagnoses     Herpes zoster without complication    -  Primary    Relevant Medications    valACYclovir (VALTREX) 1000 mg tablet                PDMP Review     None            LILIAN AZAR MD, FAAFP  [facility]      NURSING NOTES:  Nursing Notes:   Agustina Knowles MA  2/7/2022  4:25 PM  Signed  Chief Complaint   Patient presents with     Derm Problem     Patient is here for a rash- possible shingles     Initial /80 (BP Location: Right arm, Patient Position: Sitting, Cuff Size: Adult Regular)   Pulse 75   Temp 98.7  F (37.1  C) (Tympanic)   Resp 16   LMP  (LMP Unknown)   SpO2 98%   Breastfeeding No  Estimated body mass index is 22.66 kg/m  as calculated from the following:    Height as of 4/22/21: 1.626 m (5' 4\").    Weight as of 4/22/21: 59.9 kg (132 lb).  Medication Reconciliation: complete    Agustina Knowles MA       FOOD SECURITY SCREENING QUESTIONS:    The next two questions are to help us understand your food security.  If you are feeling you need any assistance in this area, we have resources available to support you today.    Hunger Vital Signs:  Within the past 12 months we worried whether our food would run out before we got money to buy more. Never  Within the past 12 months the food we " bought just didn't last and we didn't have money to get more. Never  Agustina Knowles MA,LPN on 2022 at 3:58 PM           SUBJECTIVE:    Margi Fiore is a 66 year old female  who presents for the following health issues:  Possible shingles     HPI  Margi Fiore is a 66 year old female presents for possible shingles.   Starting having symptoms 4 days ago.  It felt like a burn on her left chest   She broke out in a rash today. Now the area does not seem to be as sore or tender. No chills or fevers with this. She did remove her bra today as having that on seem to irritate her skin. She is not immunosuppressed.    She hasn't had shingles vaccination.        No Known Allergies  Current Outpatient Medications   Medication     aspirin 81 MG EC tablet     UNKNOWN TO PATIENT     valACYclovir (VALTREX) 1000 mg tablet     No current facility-administered medications for this visit.      Past Medical History:   Diagnosis Date     Breast lump     FNA benign breast mass age early 40's     Hypothyroidism     2014     Osteopenia 2020    Dexa      Personal history of other medical treatment (CODE)      SAB x 3 EAB x 1      Past Surgical History:   Procedure Laterality Date     COLONOSCOPY N/A 2021    Procedure: COLONOSCOPY, WITH POLYPECTOMY;  Surgeon: Dominguez Beltran MD;  Location:  OR       Review of Systems     PHQ-2 Score:     PHQ-2 (  [employer]) 2022   Q1: Little interest or pleasure in doing things 0 0   Q2: Feeling down, depressed or hopeless 0 0   PHQ-2 Score 0 0   PHQ-2 Total Score (12-17 Years)- Positive if 3 or more points; Administer PHQ-A if positive - 0   Q1: Little interest or pleasure in doing things Not at all -   Q2: Feeling down, depressed or hopeless Not at all -   PHQ-2 Score 0 -         No flowsheet data found.  No flowsheet data found.        OBJECTIVE:     Objective  /80 (BP Location: Right arm, Patient Position: Sitting, Cuff Size: Adult Regular)    Pulse 75   Temp 98.7  F (37.1  C) (Tympanic)   Resp 16   LMP  (LMP Unknown)   SpO2 98%   Breastfeeding No  There is no height or weight on file to calculate BMI.    Wt Readings from Last 4 Encounters:   04/22/21 59.9 kg (132 lb)   06/10/16 59.9 kg (132 lb)   02/11/14 61.2 kg (135 lb)       Nursing notes and VS reviewed    Physical Exam   GENERAL: healthy, alert and no distress  SKIN: Vesicular rash in left T-8 distribution    - no drainage, mildly tender           No results found for any visits on 02/07/22.              Answers for HPI/ROS submitted by the patient on 2/7/2022  How many servings of fruits and vegetables do you eat daily?: 2-3  On average, how many sweetened beverages do you drink each day (Examples: soda, juice, sweet tea, etc.  Do NOT count diet or artificially sweetened beverages)?: 0  How many minutes a day do you exercise enough to make your heart beat faster?: 30 to 60  How many days a week do you exercise enough to make your heart beat faster?: 3 or less  How many days per week do you miss taking your medication?: 0

## 2022-02-07 NOTE — PATIENT INSTRUCTIONS
Calamine lotion   Aloe vera gel         Valtrex -anti-viral medication prescription       Patient Education     Shingles  Shingles is a viral infection caused by the same virus that causes chicken pox. Anyone who has had chicken pox may get shingles later in life. The virus stays in the body, but remains asleep (dormant). Shingles often occurs in older persons or persons with lowered immunity. But it can affect anyone at any age.  Shingles starts as a tingling patch of skin on one side of the body. Small, painful blisters may then appear. The rash rarely spreads to other parts of the body.  Exposure to shingles can't cause shingles. However, it can cause chicken pox in anyone who has not had chicken pox or has not been vaccinated. The contagious period ends when all blisters have crusted over, generally 1 to 2 weeks after the illness starts.  After the blisters heal, the affected skin may be sensitive or painful for weeks or months, gradually resolving over time. But, sometimes this can last longer and be permanent (called postherpetic neuralgia.)  Shingles vaccines are available. Vaccination can help prevent shingles or make it less painful. It is generally recommended for adults older than 50, even if you've had singles in the past. Talk with your healthcare provider about when to get vaccinated and which vaccine is best for you.  Home care    Medicines may be prescribed to help relieve pain. Take these medicines as directed. Ask your healthcare provider or pharmacist before using over-the-counter medicines for helping treat pain and itching.    In certain cases, antiviral medicines may be prescribed to reduce pain, shorten the illness, and prevent neuralgia. Take these medicines as directed.    Compresses made from a solution of cool water mixed with cornstarch or baking soda may help relieve pain and itching.     Gently wash skin daily with soap and water to help prevent infection. Be certain to rinse off all of  the soap, which can be irritating.    Trim fingernails and try not to scratch. Scratching the sores may leave scars.    Stay home from work or school until all blisters have formed a crust and you are no longer contagious.  Follow-up care  Follow up with your healthcare provider, or as directed.  When to seek medical advice    Fever of 100.4 F (38 C) or higher, or as directed by your healthcare provider    Affected skin is on the face or neck and any of the following occur:  ? Headache  ? Eye pain  ? Changes in vision  ? Sores near the eye  ? Weakness of facial muscles    Blisters occurring on new areas of the body    Pain, redness, or swelling of a joint    Signs of skin infection: colored drainage from the sores, warmth, increasing redness, fever, or increasing pain  Agustin last reviewed this educational content on 4/1/2018 2000-2021 [employer]. All rights reserved. This information is not intended as a substitute for professional medical care. Always follow your healthcare professional's instructions.

## 2022-03-11 ENCOUNTER — MYC MEDICAL ADVICE (OUTPATIENT)
Dept: FAMILY MEDICINE | Facility: OTHER | Age: 67
End: 2022-03-11
Payer: OTHER GOVERNMENT

## 2022-03-11 DIAGNOSIS — B02.9 HERPES ZOSTER WITHOUT COMPLICATION: ICD-10-CM

## 2022-03-11 RX ORDER — VALACYCLOVIR HYDROCHLORIDE 1 G/1
1000 TABLET, FILM COATED ORAL 3 TIMES DAILY
Qty: 21 TABLET | Refills: 0 | Status: CANCELLED | OUTPATIENT
Start: 2022-03-11

## 2022-03-11 NOTE — TELEPHONE ENCOUNTER
Pending Prescriptions:                       Disp   Refills    valACYclovir (VALTREX) 1000 mg tablet     21 tab*0            Sig: Take 1 tablet (1,000 mg) by mouth 3 times daily

## 2022-03-13 NOTE — TELEPHONE ENCOUNTER
Call patient - additional antiviral medication is not helpful.  Medication that is available over the counter such as capsaicin cream and Voltaren gel can be helpful.  There are prescription medications that could be used too. If she would like a prescription medication she should set up an e-visit or in person visit.  Yulia Lopez MD

## 2022-05-21 ENCOUNTER — HEALTH MAINTENANCE LETTER (OUTPATIENT)
Age: 67
End: 2022-05-21

## 2022-07-29 ENCOUNTER — HOSPITAL ENCOUNTER (OUTPATIENT)
Dept: MAMMOGRAPHY | Facility: OTHER | Age: 67
Discharge: HOME | End: 2022-07-29
Attending: FAMILY MEDICINE | Admitting: FAMILY MEDICINE
Payer: MEDICARE

## 2022-07-29 DIAGNOSIS — Z12.31 VISIT FOR SCREENING MAMMOGRAM: ICD-10-CM

## 2022-07-29 PROCEDURE — 77067 SCR MAMMO BI INCL CAD: CPT

## 2022-08-22 ENCOUNTER — HOSPITAL ENCOUNTER (OUTPATIENT)
Dept: MAMMOGRAPHY | Facility: OTHER | Age: 67
Discharge: HOME | End: 2022-08-22
Attending: FAMILY MEDICINE
Payer: MEDICARE

## 2022-08-22 ENCOUNTER — HOSPITAL ENCOUNTER (OUTPATIENT)
Dept: ULTRASOUND IMAGING | Facility: OTHER | Age: 67
Discharge: HOME | End: 2022-08-22
Attending: FAMILY MEDICINE
Payer: MEDICARE

## 2022-08-22 DIAGNOSIS — R92.8 ABNORMAL FINDING ON BREAST IMAGING: ICD-10-CM

## 2022-08-22 PROCEDURE — 76642 ULTRASOUND BREAST LIMITED: CPT | Mod: RT

## 2022-08-22 PROCEDURE — 77061 BREAST TOMOSYNTHESIS UNI: CPT | Mod: RT

## 2022-09-17 ENCOUNTER — HEALTH MAINTENANCE LETTER (OUTPATIENT)
Age: 67
End: 2022-09-17

## 2023-06-04 ENCOUNTER — HEALTH MAINTENANCE LETTER (OUTPATIENT)
Age: 68
End: 2023-06-04

## 2023-11-10 ENCOUNTER — HOSPITAL ENCOUNTER (OUTPATIENT)
Dept: MAMMOGRAPHY | Facility: OTHER | Age: 68
Discharge: HOME | End: 2023-11-10
Attending: FAMILY MEDICINE | Admitting: FAMILY MEDICINE
Payer: MEDICARE

## 2023-11-10 DIAGNOSIS — Z12.31 VISIT FOR SCREENING MAMMOGRAM: ICD-10-CM

## 2023-11-10 PROCEDURE — 77067 SCR MAMMO BI INCL CAD: CPT

## 2024-07-13 ENCOUNTER — HEALTH MAINTENANCE LETTER (OUTPATIENT)
Age: 69
End: 2024-07-13

## 2024-07-15 ENCOUNTER — NURSE TRIAGE (OUTPATIENT)
Dept: NURSING | Facility: CLINIC | Age: 69
End: 2024-07-15
Payer: OTHER GOVERNMENT

## 2024-07-15 ENCOUNTER — HOSPITAL ENCOUNTER (OUTPATIENT)
Dept: MRI IMAGING | Facility: OTHER | Age: 69
Discharge: HOME | End: 2024-07-15
Attending: FAMILY MEDICINE
Payer: MEDICARE

## 2024-07-15 ENCOUNTER — OFFICE VISIT (OUTPATIENT)
Dept: PEDIATRICS | Facility: OTHER | Age: 69
End: 2024-07-15
Attending: INTERNAL MEDICINE
Payer: MEDICARE

## 2024-07-15 VITALS
HEIGHT: 62 IN | RESPIRATION RATE: 16 BRPM | HEART RATE: 76 BPM | TEMPERATURE: 99 F | SYSTOLIC BLOOD PRESSURE: 126 MMHG | WEIGHT: 129 LBS | BODY MASS INDEX: 23.74 KG/M2 | OXYGEN SATURATION: 97 % | DIASTOLIC BLOOD PRESSURE: 74 MMHG

## 2024-07-15 DIAGNOSIS — M75.101 ROTATOR CUFF TEAR, RIGHT: ICD-10-CM

## 2024-07-15 DIAGNOSIS — M25.511 PAIN IN RIGHT SHOULDER: ICD-10-CM

## 2024-07-15 DIAGNOSIS — I49.9 IRREGULAR HEART BEATS: ICD-10-CM

## 2024-07-15 DIAGNOSIS — E78.2 MIXED HYPERLIPIDEMIA: ICD-10-CM

## 2024-07-15 DIAGNOSIS — R94.31 ABNORMAL ECG: ICD-10-CM

## 2024-07-15 DIAGNOSIS — I49.3 SYMPTOMATIC PVCS: Primary | ICD-10-CM

## 2024-07-15 PROBLEM — Z83.3 FAMILY HISTORY OF DIABETES MELLITUS: Status: RESOLVED | Noted: 2018-02-27 | Resolved: 2024-07-15

## 2024-07-15 LAB
ATRIAL RATE - MUSE: 78 BPM
DIASTOLIC BLOOD PRESSURE - MUSE: NORMAL MMHG
INTERPRETATION ECG - MUSE: NORMAL
P AXIS - MUSE: 78 DEGREES
PR INTERVAL - MUSE: 156 MS
QRS DURATION - MUSE: 84 MS
QT - MUSE: 354 MS
QTC - MUSE: 403 MS
R AXIS - MUSE: 66 DEGREES
SYSTOLIC BLOOD PRESSURE - MUSE: NORMAL MMHG
T AXIS - MUSE: 69 DEGREES
VENTRICULAR RATE- MUSE: 78 BPM

## 2024-07-15 PROCEDURE — 73221 MRI JOINT UPR EXTREM W/O DYE: CPT | Mod: RT

## 2024-07-15 PROCEDURE — G0463 HOSPITAL OUTPT CLINIC VISIT: HCPCS

## 2024-07-15 PROCEDURE — 93005 ELECTROCARDIOGRAM TRACING: CPT | Performed by: INTERNAL MEDICINE

## 2024-07-15 PROCEDURE — 93010 ELECTROCARDIOGRAM REPORT: CPT | Performed by: INTERNAL MEDICINE

## 2024-07-15 PROCEDURE — 99214 OFFICE O/P EST MOD 30 MIN: CPT | Performed by: INTERNAL MEDICINE

## 2024-07-15 RX ORDER — LEVOTHYROXINE, LIOTHYRONINE 57; 13.5 UG/1; UG/1
0.5 TABLET ORAL DAILY
COMMUNITY
Start: 2024-05-04

## 2024-07-15 RX ORDER — PYRIDOXINE HCL (VITAMIN B6) 50 MG
50 TABLET ORAL DAILY
COMMUNITY

## 2024-07-15 ASSESSMENT — PAIN SCALES - GENERAL: PAINLEVEL: MILD PAIN (2)

## 2024-07-15 NOTE — NURSING NOTE
"Chief Complaint   Patient presents with    Irregular Heart Beat       Initial /74   Pulse 76   Temp 99  F (37.2  C) (Tympanic)   Resp 16   Ht 1.575 m (5' 2\")   Wt 58.5 kg (129 lb)   LMP 07/15/2004 (Approximate)   SpO2 97%   Breastfeeding No   BMI 23.59 kg/m   Estimated body mass index is 23.59 kg/m  as calculated from the following:    Height as of this encounter: 1.575 m (5' 2\").    Weight as of this encounter: 58.5 kg (129 lb).  Medication Review: complete    The next two questions are to help us understand your food security.  If you are feeling you need any assistance in this area, we have resources available to support you today.           No data to display                  Health Care Directive:  Patient does not have a Health Care Directive or Living Will: Patient states has Advance Directive and will bring in a copy to clinic.    Norma J. Gosselin, LPN      "

## 2024-07-15 NOTE — TELEPHONE ENCOUNTER
Patient has been having shoulder pain for the past couple of months. Patient has been treating on her own. Patient had blood work completed with a naturopath, ruled out the shoulder and then recommended a heart scan. Patient has an MRI for her shoulder this evening. Over the weekend patient used her pulse oximeter which showed her pulse was irregular. Patient felt her pulse and it was dropping a beat regularly. Over the past few months patient has noticed a decreased strength. Shoulder discomfort rating 2/10.   Denies chest pain, dizziness, shortness of breath  Protocol recommends see HCP within 4 hours  Care advice given  Connected with scheduling   Zohra York RN   07/15/24 7:26 AM  [facility] Nurse Advisor      Reason for Disposition   [1] Skipped or extra beat(s) AND [2] increases with exercise or exertion    Additional Information   Negative: Passed out (i.e., lost consciousness, collapsed and was not responding)   Negative: Shock suspected (e.g., cold/pale/clammy skin, too weak to stand, low BP, rapid pulse)   Negative: Difficult to awaken or acting confused (e.g., disoriented, slurred speech)   Negative: Visible sweat on face or sweat dripping down face   Negative: Unable to walk, or can only walk with assistance (e.g., requires support)   Negative: [1] Received SHOCK from implantable cardiac defibrillator AND [2] persisting symptoms (i.e., palpitations, lightheadedness)   Negative: [1] Dizziness, lightheadedness, or weakness AND [2] heart beating very rapidly (e.g., > 140 / minute)   Negative: [1] Dizziness, lightheadedness, or weakness AND [2] heart beating very slowly (e.g., < 50 / minute)   Negative: Sounds like a life-threatening emergency to the triager   Negative: Chest pain   Negative: Implantable Cardiac Defibrillator (ICD) or a pacemaker symptoms or questions   Negative: Difficulty breathing   Negative: Dizziness, lightheadedness, or weakness   Negative: [1] Heart beating very rapidly (e.g.,  > 140 / minute) AND [2] present now  (Exception: During exercise.)   Negative: Heart beating very slowly (e.g., < 50 / minute)  (Exception: Athlete and heart rate normal for caller.)   Negative: New or worsened shortness of breath with activity (dyspnea on exertion)   Negative: Patient sounds very sick or weak to the triager   Negative: [1] Heart beating very rapidly (e.g., > 140 / minute) AND [2] not present now  (Exception: During exercise.)    Protocols used: Heart Rate and Heartbeat [id number]

## 2024-07-15 NOTE — PROGRESS NOTES
"Assessment & Plan   1. Symptomatic PVCs  EKG reveals PVCs.  I believe this is what she was noticing as the irregularity.  Also possibly could have been a poor waveform or equipment malfunction.  Differential diagnosis also includes paroxysmal atrial fibrillation.  We discussed options and decided to obtain event monitoring.  - Echocardiogram Complete; Future    2. Irregular heart beats  - EKG 12-lead, tracing only (Same Day)  - ZIO PATCH MAIL OUT    3. Mixed hyperlipidemia  Her 10-year risk score is 6.9% and statins are not yet indicated.    4. Abnormal ECG  She has a septal infarct pattern which could be a normal variant or a sign of underlying pathology.  We discussed options and decided to obtain an echocardiogram.  - Echocardiogram Complete; Future      Return in about 3 months (around 10/15/2024), or if symptoms worsen or fail to improve, for [facility] for physical.    Signed, Bernabe Hunt MD, FAAP, FACP  Internal Medicine & Pediatrics    Subjective   Margi Fiore is a 69 year old female who presents for Irregular Heart Beat.  She is really most bothered by shoulder issues.  She is scheduled to have an MRI today.  It was ordered by her naturopath through Dr. Croft's office.  She would like me to look at her cholesterol numbers.  Over the weekend she was checking her pulse ox because of her shoulder issues and she realized her pulse was all over the place.  Which showed 83-1 01 with a saturation around 96%.  She is feeling no heart palpitations.    Objective   Vitals: /74   Pulse 76   Temp 99  F (37.2  C) (Tympanic)   Resp 16   Ht 1.575 m (5' 2\")   Wt 58.5 kg (129 lb)   LMP 07/15/2004 (Approximate)   SpO2 97%   Breastfeeding No   BMI 23.59 kg/m      Cardiovascular: Regular rate and rhythm with occasional skipped beats every fifth or 6 beat.  No murmurs.    Review and Analysis of Data   I personally reviewed the following:  External notes: No  Results: Yes I reviewed the lab obtained by her " naturopath via Dr. Croft's clinic.  , total cholesterol 237, HDL 62.  Use of an independent historian: No  Independent review of a test performed by another physician: No  Discussion of management with another physician: No  Moderate risk of morbidity from additional diagnostic testing and/or treatment.

## 2024-08-09 ENCOUNTER — TELEPHONE (OUTPATIENT)
Dept: PEDIATRICS | Facility: OTHER | Age: 69
End: 2024-08-09
Payer: OTHER GOVERNMENT

## 2024-08-09 NOTE — TELEPHONE ENCOUNTER
Spoke with patient. No results in chart yet. Will call again next week.  Kell Virk LPN.........................8/9/2024  11:11 AM

## 2024-08-09 NOTE — TELEPHONE ENCOUNTER
Patient had a ZioPatch on for two weeks.     Reason for call: Request for results.    Name of test or procedure: ZioPatch    Date of test or procedure: 7/15/24    Location of test or procedure: [facility] - mailed back 7/31    Preferred method for responding to this message: Telephone Call    Phone number patient can be reached at: Home number on file [phone] (home)    If we can't reach you directly, may we leave a detailed response at the number you provided?Yes    Vicenta Schneider on 8/9/2024 at 10:57 AM

## 2024-08-13 ENCOUNTER — HOSPITAL ENCOUNTER (OUTPATIENT)
Dept: CARDIOLOGY | Facility: OTHER | Age: 69
Discharge: HOME | End: 2024-08-13
Attending: INTERNAL MEDICINE | Admitting: INTERNAL MEDICINE
Payer: MEDICARE

## 2024-08-13 DIAGNOSIS — I49.3 SYMPTOMATIC PVCS: ICD-10-CM

## 2024-08-13 DIAGNOSIS — R94.31 ABNORMAL ECG: ICD-10-CM

## 2024-08-13 LAB — LVEF ECHO: NORMAL

## 2024-08-13 PROCEDURE — 93306 TTE W/DOPPLER COMPLETE: CPT

## 2024-08-13 PROCEDURE — 93306 TTE W/DOPPLER COMPLETE: CPT | Mod: 26 | Performed by: INTERNAL MEDICINE

## 2024-08-14 PROCEDURE — 93248 EXT ECG>7D<15D REV&INTERPJ: CPT | Performed by: INTERNAL MEDICINE

## 2024-10-15 ENCOUNTER — OFFICE VISIT (OUTPATIENT)
Dept: FAMILY MEDICINE | Facility: OTHER | Age: 69
End: 2024-10-15
Attending: FAMILY MEDICINE
Payer: MEDICARE

## 2024-10-15 VITALS
HEART RATE: 93 BPM | WEIGHT: 123.8 LBS | SYSTOLIC BLOOD PRESSURE: 124 MMHG | BODY MASS INDEX: 22.78 KG/M2 | DIASTOLIC BLOOD PRESSURE: 70 MMHG | TEMPERATURE: 97.1 F | RESPIRATION RATE: 16 BRPM | OXYGEN SATURATION: 100 % | HEIGHT: 62 IN

## 2024-10-15 DIAGNOSIS — E03.9 HYPOTHYROIDISM, UNSPECIFIED TYPE: ICD-10-CM

## 2024-10-15 DIAGNOSIS — Z28.21 REFUSED PNEUMOCOCCAL VACCINATION: ICD-10-CM

## 2024-10-15 DIAGNOSIS — Z28.21 REFUSED INFLUENZA VACCINE: ICD-10-CM

## 2024-10-15 DIAGNOSIS — I49.3 PVC (PREMATURE VENTRICULAR CONTRACTION): ICD-10-CM

## 2024-10-15 DIAGNOSIS — Z12.31 BREAST CANCER SCREENING BY MAMMOGRAM: ICD-10-CM

## 2024-10-15 DIAGNOSIS — Z63.6 CAREGIVER STRESS: ICD-10-CM

## 2024-10-15 DIAGNOSIS — D72.819 LEUKOPENIA, UNSPECIFIED TYPE: ICD-10-CM

## 2024-10-15 DIAGNOSIS — M77.8 SHOULDER TENDONITIS, UNSPECIFIED LATERALITY: ICD-10-CM

## 2024-10-15 DIAGNOSIS — Z00.00 ENCOUNTER FOR MEDICARE ANNUAL WELLNESS EXAM: Primary | ICD-10-CM

## 2024-10-15 DIAGNOSIS — D64.9 ANEMIA, UNSPECIFIED TYPE: Primary | ICD-10-CM

## 2024-10-15 DIAGNOSIS — D64.9 ANEMIA, UNSPECIFIED TYPE: ICD-10-CM

## 2024-10-15 LAB
ANION GAP SERPL CALCULATED.3IONS-SCNC: 9 MMOL/L (ref 7–15)
BASOPHILS # BLD AUTO: 0 10E3/UL (ref 0–0.2)
BASOPHILS NFR BLD AUTO: 1 %
BUN SERPL-MCNC: 9.3 MG/DL (ref 8–23)
CALCIUM SERPL-MCNC: 9.9 MG/DL (ref 8.8–10.4)
CHLORIDE SERPL-SCNC: 105 MMOL/L (ref 98–107)
CHOLEST SERPL-MCNC: 208 MG/DL
CREAT SERPL-MCNC: 0.49 MG/DL (ref 0.51–0.95)
EGFRCR SERPLBLD CKD-EPI 2021: >90 ML/MIN/1.73M2
EOSINOPHIL # BLD AUTO: 0.1 10E3/UL (ref 0–0.7)
EOSINOPHIL NFR BLD AUTO: 2 %
ERYTHROCYTE [DISTWIDTH] IN BLOOD BY AUTOMATED COUNT: 15.4 % (ref 10–15)
FASTING STATUS PATIENT QL REPORTED: YES
FASTING STATUS PATIENT QL REPORTED: YES
FERRITIN SERPL-MCNC: 166 NG/ML (ref 11–328)
GLUCOSE SERPL-MCNC: 98 MG/DL (ref 70–99)
HCO3 SERPL-SCNC: 28 MMOL/L (ref 22–29)
HCT VFR BLD AUTO: 35.1 % (ref 35–47)
HDLC SERPL-MCNC: 56 MG/DL
HGB BLD-MCNC: 11 G/DL (ref 11.7–15.7)
IMM GRANULOCYTES # BLD: 0 10E3/UL
IMM GRANULOCYTES NFR BLD: 0 %
IRON BINDING CAPACITY (ROCHE): 267 UG/DL (ref 240–430)
IRON SATN MFR SERPL: 11 % (ref 15–46)
IRON SERPL-MCNC: 29 UG/DL (ref 37–145)
LDLC SERPL CALC-MCNC: 138 MG/DL
LYMPHOCYTES # BLD AUTO: 0.6 10E3/UL (ref 0.8–5.3)
LYMPHOCYTES NFR BLD AUTO: 16 %
MCH RBC QN AUTO: 28.6 PG (ref 26.5–33)
MCHC RBC AUTO-ENTMCNC: 31.3 G/DL (ref 31.5–36.5)
MCV RBC AUTO: 91 FL (ref 78–100)
MONOCYTES # BLD AUTO: 0.3 10E3/UL (ref 0–1.3)
MONOCYTES NFR BLD AUTO: 8 %
NEUTROPHILS # BLD AUTO: 2.7 10E3/UL (ref 1.6–8.3)
NEUTROPHILS NFR BLD AUTO: 73 %
NONHDLC SERPL-MCNC: 152 MG/DL
NRBC # BLD AUTO: 0 10E3/UL
NRBC BLD AUTO-RTO: 0 /100
PLATELET # BLD AUTO: 265 10E3/UL (ref 150–450)
POTASSIUM SERPL-SCNC: 4.1 MMOL/L (ref 3.4–5.3)
RBC # BLD AUTO: 3.84 10E6/UL (ref 3.8–5.2)
SODIUM SERPL-SCNC: 142 MMOL/L (ref 135–145)
TRIGL SERPL-MCNC: 72 MG/DL
TSH SERPL DL<=0.005 MIU/L-ACNC: 0.64 UIU/ML (ref 0.3–4.2)
VIT B12 SERPL-MCNC: >2000 PG/ML (ref 232–1245)
WBC # BLD AUTO: 3.7 10E3/UL (ref 4–11)

## 2024-10-15 PROCEDURE — G0439 PPPS, SUBSEQ VISIT: HCPCS | Performed by: FAMILY MEDICINE

## 2024-10-15 PROCEDURE — 84443 ASSAY THYROID STIM HORMONE: CPT | Mod: ZL | Performed by: FAMILY MEDICINE

## 2024-10-15 PROCEDURE — G0463 HOSPITAL OUTPT CLINIC VISIT: HCPCS | Performed by: FAMILY MEDICINE

## 2024-10-15 PROCEDURE — 82728 ASSAY OF FERRITIN: CPT | Mod: ZL | Performed by: FAMILY MEDICINE

## 2024-10-15 PROCEDURE — 99214 OFFICE O/P EST MOD 30 MIN: CPT | Mod: 25 | Performed by: FAMILY MEDICINE

## 2024-10-15 PROCEDURE — 85025 COMPLETE CBC W/AUTO DIFF WBC: CPT | Mod: ZL | Performed by: FAMILY MEDICINE

## 2024-10-15 PROCEDURE — 80061 LIPID PANEL: CPT | Mod: ZL | Performed by: FAMILY MEDICINE

## 2024-10-15 PROCEDURE — 36415 COLL VENOUS BLD VENIPUNCTURE: CPT | Mod: ZL | Performed by: FAMILY MEDICINE

## 2024-10-15 PROCEDURE — 82607 VITAMIN B-12: CPT | Mod: ZL | Performed by: FAMILY MEDICINE

## 2024-10-15 PROCEDURE — 80048 BASIC METABOLIC PNL TOTAL CA: CPT | Mod: ZL | Performed by: FAMILY MEDICINE

## 2024-10-15 PROCEDURE — 83550 IRON BINDING TEST: CPT | Mod: ZL | Performed by: FAMILY MEDICINE

## 2024-10-15 RX ORDER — ASCORBIC ACID 500 MG/5ML
4.9 SYRUP ORAL DAILY
COMMUNITY

## 2024-10-15 RX ORDER — CYCLOBENZAPRINE HCL 5 MG
TABLET ORAL
COMMUNITY
Start: 2024-09-12

## 2024-10-15 RX ORDER — COD LIVER OIL
5 OIL (ML) ORAL DAILY
COMMUNITY

## 2024-10-15 RX ORDER — LEVOTHYROXINE, LIOTHYRONINE 57; 13.5 UG/1; UG/1
0.5 TABLET ORAL DAILY
Qty: 90 TABLET | Refills: 3 | Status: SHIPPED | OUTPATIENT
Start: 2024-10-15

## 2024-10-15 RX ORDER — COVID-19 MOLECULAR TEST ASSAY
KIT MISCELLANEOUS
COMMUNITY
Start: 2024-09-10 | End: 2024-10-15

## 2024-10-15 RX ORDER — UBIDECARENONE 30 MG/5 ML
4.9 LIQUID (ML) ORAL DAILY
COMMUNITY

## 2024-10-15 SDOH — SOCIAL STABILITY - SOCIAL INSECURITY: DEPENDENT RELATIVE NEEDING CARE AT HOME: Z63.6

## 2024-10-15 SDOH — HEALTH STABILITY: PHYSICAL HEALTH: ON AVERAGE, HOW MANY DAYS PER WEEK DO YOU ENGAGE IN MODERATE TO STRENUOUS EXERCISE (LIKE A BRISK WALK)?: 3 DAYS

## 2024-10-15 ASSESSMENT — PAIN SCALES - GENERAL: PAINLEVEL: NO PAIN (0)

## 2024-10-15 ASSESSMENT — SOCIAL DETERMINANTS OF HEALTH (SDOH): HOW OFTEN DO YOU GET TOGETHER WITH FRIENDS OR RELATIVES?: MORE THAN THREE TIMES A WEEK

## 2024-10-15 NOTE — NURSING NOTE
"Chief Complaint   Patient presents with    Medicare Visit     Annual well visit         Initial /70 (BP Location: Right arm, Patient Position: Sitting, Cuff Size: Adult Regular)   Pulse 93   Temp 97.1  F (36.2  C) (Temporal)   Resp 16   Ht 1.575 m (5' 2\")   Wt 56.2 kg (123 lb 12.8 oz)   LMP 07/15/2004 (Approximate)   SpO2 100%   BMI 22.64 kg/m   Estimated body mass index is 22.64 kg/m  as calculated from the following:    Height as of this encounter: 1.575 m (5' 2\").    Weight as of this encounter: 56.2 kg (123 lb 12.8 oz).    Medication Review: complete    The next two questions are to help us understand your food security.  If you are feeling you need any assistance in this area, we have resources available to support you today.          10/15/2024   SDOH- Food Insecurity   Within the past 12 months, did you worry that your food would run out before you got money to buy more? N   Within the past 12 months, did the food you bought just not last and you didn t have money to get more? N          Health Care Directive:  Patient does not have a Health Care Directive or Living Will: Patient states has Advance Directive and will bring in a copy to clinic.    Kristina Porter LPN      "

## 2024-10-15 NOTE — PROGRESS NOTES
Preventive Care Visit  [facility]  LILIAN AZAR MD, Family Medicine  Oct 15, 2024      Assessment & Plan       ICD-10-CM    1. Encounter for Medicare annual wellness exam  Z00.00       2. Hypothyroidism, unspecified type  E03.9 TSH     Basic Metabolic Panel     Lipid Profile     NP THYROID 90 MG tablet     TSH     Basic Metabolic Panel     Lipid Profile      3. Caregiver stress  Z63.6       4. Breast cancer screening by mammogram  Z12.31 MA Screening Bilateral w/ Hermelindo      5. PVC (premature ventricular contraction)  I49.3       6. Refused influenza vaccine  Z28.21       7. Refused pneumococcal vaccination  Z28.21       8. Leukopenia, unspecified type  D72.819 CBC and Differential     CBC and Differential      9. Shoulder tendonitis, unspecified laterality  M77.8       10. Anemia, unspecified type  D64.9         Thyroid labs due today   Refused immunizations  Leukopenia with past oncology evaluation - check for stability.  Cause is uncertain  PVCs with otherwise negative work up short of stress testing  Cardiac risk calculated and estimated at 8-9%  Mammogram next month  Colon cancer screening up to date   Caution with aspirin use long term in regards to bleeding, GI    New finding of anemia today in patient taking aspirin TID.  Will check ferritin, iron studies, vitamin B12.  Consider stopping aspirin and egd, consider follow up with hematology due to low WBC.              Counseling  Appropriate preventive services were addressed with this patient via screening, questionnaire, or discussion as appropriate for fall prevention, nutrition, physical activity, Tobacco-use cessation, social engagement, weight loss and cognition.  Checklist reviewing preventive services available has been given to the patient.  Reviewed patient's diet, addressing concerns and/or questions.   She is at risk for lack of exercise and has been provided with information to increase physical activity for the  benefit of her well-being.   She is at risk for psychosocial distress and has been provided with information to reduce risk.   Discussed possible causes of fatigue.         No follow-ups on file.    Subjective   Margi is a 69 year old, presenting for the following:  Medicare Visit (Annual well visit/)        10/15/2024     7:58 AM   Additional Questions   Roomed by Kristina FRANK LPN           Health Care Directive  Patient does not have a Health Care Directive or Living Will: Patient states has Advance Directive and will bring in a copy to clinic.    HPI  Margi Fiore is a 69 year old female in for AWV.  She refuses to do the cognitive questions - states she is too stressed for that.        Hypothyroidism Follow-up    Since last visit, patient describes the following symptoms: Weight stable, no hair loss, no skin changes, no constipation, no loose stools    History of PVCs - stable    Bilateral shoulder pain      Leukopenia, chronic with hematology evaluation last year           10/15/2024   General Health   How would you rate your overall physical health? Good   Feel stress (tense, anxious, or unable to sleep) Only a little      (!) STRESS CONCERN      10/15/2024   Nutrition   Diet: Regular (no restrictions)            10/15/2024   Exercise   Days per week of moderate/strenous exercise 3 days            10/15/2024   Social Factors   Frequency of gathering with friends or relatives More than three times a week   Worry food won't last until get money to buy more No   Food not last or not have enough money for food? No   Do you have housing? (Housing is defined as stable permanent housing and does not include staying ouside in a car, in a tent, in an abandoned building, in an overnight shelter, or couch-surfing.) Yes   Are you worried about losing your housing? No   Lack of transportation? No   Unable to get utilities (heat,electricity)? No            10/15/2024   Fall Risk   Fallen 2 or more times in the past year? No    Trouble with walking or balance? No             10/15/2024   Activities of Daily Living- Home Safety   Needs help with the following daily activites None of the above   Safety concerns in the home None of the above            10/15/2024   Dental   Dentist two times every year? Yes            10/15/2024   Hearing Screening   Hearing concerns? None of the above            10/15/2024   Driving Risk Screening   Patient/family members have concerns about driving No            10/15/2024   General Alertness/Fatigue Screening   Have you been more tired than usual lately? (!) YES            10/15/2024   Urinary Incontinence Screening   Bothered by leaking urine in past 6 months No            10/15/2024   TB Screening   Were you born outside of the [place]? No              Today's PHQ-2 Score:       7/15/2024     8:31 AM   PHQ-2 ( 1999 [employer])   Q1: Little interest or pleasure in doing things 0   Q2: Feeling down, depressed or hopeless 0   PHQ-2 Score 0   Q1: Little interest or pleasure in doing things Not at all   Q2: Feeling down, depressed or hopeless Not at all   PHQ-2 Score 0         10/15/2024   Substance Use   Alcohol more than 3/day or more than 7/wk No   Do you have a current opioid prescription? No   How severe/bad is pain from 1 to 10? 1/10   Do you use any other substances recreationally? No        Social History     Tobacco Use    Smoking status: Never     Passive exposure: Never    Smokeless tobacco: Never   Vaping Use    Vaping status: Never Used   Substance Use Topics    Alcohol use: No    Drug use: Never           11/10/2023   LAST FHS-7 RESULTS   1st degree relative breast or ovarian cancer No   Any relative bilateral breast cancer No   Any male have breast cancer No   Any ONE woman have BOTH breast AND ovarian cancer No   Any woman with breast cancer before 50yrs No   2 or more relatives with breast AND/OR ovarian cancer No   2 or more relatives with breast AND/OR bowel cancer No           Mammogram Screening  - Mammogram every 1-2 years updated in Health Maintenance based on mutual decision making         ASCVD Risk   The 10-year ASCVD risk score (Lopez PERRIN, et al., 2019) is: 9%    Values used to calculate the score:      Age: 69 years      Sex: Female      Is Non- : No      Diabetic: No      Tobacco smoker: No      Systolic Blood Pressure: 124 mmHg      Is BP treated: No      HDL Cholesterol: 56 mg/dL      Total Cholesterol: 208 mg/dL              Reviewed and updated as needed this visit by Provider         Vu Bagley            Past Medical History:   Diagnosis Date    Breast lump     FNA benign breast mass age early 40's    Hypothyroidism     2014    Osteopenia 2020    Dexa     Personal history of other medical treatment (CODE)      SAB x 3 EAB x 1     Past Surgical History:   Procedure Laterality Date    COLONOSCOPY N/A 2021    Procedure: COLONOSCOPY, WITH POLYPECTOMY;  Surgeon: Dominguez Beltran MD;  Location:  OR     Current providers sharing in care for this patient include:  Patient Care Team:  Yulia Mcdonnell MD as PCP - General (Family Practice)  Bernabe Hunt MD as Assigned PCP    The following health maintenance items are reviewed in Epic and correct as of today:  Health Maintenance   Topic Date Due    Pneumococcal Vaccine: 65+ Years (1 of 2 - PCV) Never done    DTAP/TDAP/TD IMMUNIZATION (1 - Tdap) Never done    RSV VACCINE (1 - Risk 60-74 years 1-dose series) Never done    INFLUENZA VACCINE (1) 2024    COVID-19 Vaccine ( - - season) 2024    MEDICARE ANNUAL WELLNESS VISIT  10/15/2025    LIPID  10/15/2025    TSH W/FREE T4 REFLEX  10/15/2025    FALL RISK ASSESSMENT  10/15/2025    MAMMO SCREENING  11/10/2025    GLUCOSE  10/15/2027    ADVANCE CARE PLANNING  10/15/2029    COLORECTAL CANCER SCREENING  2031    DEXA  2035    PHQ-2 (once per calendar year)  Completed    ZOSTER IMMUNIZATION  Completed    HEPATITIS  "C SCREENING  Addressed    HPV IMMUNIZATION  Aged Out    MENINGITIS IMMUNIZATION  Aged Out    RSV MONOCLONAL ANTIBODY  Aged Out         Review of Systems  Vision checked every year or two  Past hearing checks  Dentist twice a year  Musculoskeletal - starting last winter was having bilateral shoulder and upper arm pain; can't do certain yoga poses.  Was seeing Dr Croft for this and was taking 2 aspirin tid.  MRI done this summer - tendinosis found.       Objective    Exam  /70 (BP Location: Right arm, Patient Position: Sitting, Cuff Size: Adult Regular)   Pulse 93   Temp 97.1  F (36.2  C) (Temporal)   Resp 16   Ht 1.575 m (5' 2\")   Wt 56.2 kg (123 lb 12.8 oz)   LMP 07/15/2004 (Approximate)   SpO2 100%   BMI 22.64 kg/m     Estimated body mass index is 22.64 kg/m  as calculated from the following:    Height as of this encounter: 1.575 m (5' 2\").    Weight as of this encounter: 56.2 kg (123 lb 12.8 oz).    Physical Exam  GENERAL: alert and no distress  EYES: Eyes grossly normal to inspection, PERRL and conjunctivae and sclerae normal  HENT: ear canals and TM's normal, nose and mouth without ulcers or lesions  NECK: no adenopathy, no asymmetry, masses, or scars  RESP: lungs clear to auscultation - no rales, rhonchi or wheezes  CV: regular rate and rhythm, normal S1 S2, no S3 or S4, no murmur, click or rub, no peripheral edema  ABDOMEN: soft, nontender, no hepatosplenomegaly, no masses and bowel sounds normal  MS: no gross musculoskeletal defects noted, no edema  SKIN: no suspicious lesions or rashes  NEURO: Normal strength and tone, mentation intact and speech normal  PSYCH: mentation appears normal, affect normal/bright        10/15/2024   Mini Cog   Mini-Cog Not Completed (choose reason) Patient declines          Patient declines, there are NO concerns for cognitive deficits.           Signed Electronically by: LILIAN AAZR MD    "

## 2024-11-03 ENCOUNTER — MYC MEDICAL ADVICE (OUTPATIENT)
Dept: FAMILY MEDICINE | Facility: OTHER | Age: 69
End: 2024-11-03
Payer: OTHER GOVERNMENT

## 2024-11-15 ENCOUNTER — MYC MEDICAL ADVICE (OUTPATIENT)
Dept: FAMILY MEDICINE | Facility: OTHER | Age: 69
End: 2024-11-15
Payer: OTHER GOVERNMENT

## 2024-11-19 ENCOUNTER — HOSPITAL ENCOUNTER (OUTPATIENT)
Dept: MAMMOGRAPHY | Facility: OTHER | Age: 69
Discharge: HOME | End: 2024-11-19
Attending: FAMILY MEDICINE
Payer: MEDICARE

## 2024-11-19 DIAGNOSIS — Z12.31 BREAST CANCER SCREENING BY MAMMOGRAM: ICD-10-CM

## 2024-11-19 PROCEDURE — 77067 SCR MAMMO BI INCL CAD: CPT

## 2024-11-19 PROCEDURE — 77063 BREAST TOMOSYNTHESIS BI: CPT

## 2024-11-25 ENCOUNTER — TRANSFERRED RECORDS (OUTPATIENT)
Dept: HEALTH INFORMATION MANAGEMENT | Facility: OTHER | Age: 69
End: 2024-11-25
Payer: OTHER GOVERNMENT

## 2024-12-02 ENCOUNTER — MYC MEDICAL ADVICE (OUTPATIENT)
Dept: FAMILY MEDICINE | Facility: OTHER | Age: 69
End: 2024-12-02
Payer: OTHER GOVERNMENT

## 2024-12-02 NOTE — TELEPHONE ENCOUNTER
"Update from [employer]:    It came back stating that [facility] is unable to respond with clinical questions.  Please see more information at the bottom of the page for next steps.  So I looked at the bottom of the page and it says, \"Your PA has been resolved, no additional PA is required. For further inquiries please contact the number on the back of the member prescription card.\"     Update from [facility]:    I submitted the PA Request but I'm still waiting to get the questions back to answer. It came back stating, \"Your demographic data has been sent to [facility] successfully!    [facility] typically takes 5-10 minutes to respond, but it may take a little longer in some cases. You will be notified by email when available. You can also check for an update later by opening this request from your dashboard. Please do not fax or call [facility] to resubmit this request. If you need assistance, please chat with [employer] or call us at [phone].    If it has been longer than 24 hours, please reach out to [facility].\"     ____________________________________________________________________________    Reached out to [facility] to inquire about PA on NP Thyroid 90Mg tablet.      Patient update on [employer].      From 11/3 [employer] Encounter:        Myra Marroquin RN on 12/2/2024 at 1:47 PM    "

## 2024-12-10 ENCOUNTER — TELEPHONE (OUTPATIENT)
Dept: FAMILY MEDICINE | Facility: OTHER | Age: 69
End: 2024-12-10
Payer: OTHER GOVERNMENT

## 2024-12-10 NOTE — TELEPHONE ENCOUNTER
Called patient in regards to making a follow up appointment in 2-3 months; no answer, will call back.    Kristina Porter LPN on 12/10/2024 at 4:11 PM

## 2025-02-26 ENCOUNTER — OFFICE VISIT (OUTPATIENT)
Dept: FAMILY MEDICINE | Facility: OTHER | Age: 70
End: 2025-02-26
Attending: FAMILY MEDICINE
Payer: MEDICARE

## 2025-02-26 ENCOUNTER — HOSPITAL ENCOUNTER (OUTPATIENT)
Dept: GENERAL RADIOLOGY | Facility: OTHER | Age: 70
Discharge: HOME | End: 2025-02-26
Attending: FAMILY MEDICINE
Payer: MEDICARE

## 2025-02-26 VITALS
TEMPERATURE: 98.3 F | SYSTOLIC BLOOD PRESSURE: 122 MMHG | OXYGEN SATURATION: 98 % | DIASTOLIC BLOOD PRESSURE: 62 MMHG | RESPIRATION RATE: 16 BRPM | HEART RATE: 101 BPM

## 2025-02-26 DIAGNOSIS — M16.12 PRIMARY OSTEOARTHRITIS OF LEFT HIP: ICD-10-CM

## 2025-02-26 DIAGNOSIS — M87.052 AVASCULAR NECROSIS OF BONE OF LEFT HIP (H): Primary | ICD-10-CM

## 2025-02-26 DIAGNOSIS — M87.051 AVASCULAR NECROSIS OF BONE OF RIGHT HIP (H): ICD-10-CM

## 2025-02-26 PROCEDURE — 73502 X-RAY EXAM HIP UNI 2-3 VIEWS: CPT

## 2025-02-26 ASSESSMENT — PAIN SCALES - GENERAL: PAINLEVEL_OUTOF10: MILD PAIN (3)

## 2025-02-26 NOTE — PROGRESS NOTES
Sports Medicine Office Note    HPI:  69-year-old female coming in for evaluation of left hip pain.  Her pain started 2024 without inciting event or injury.  Initially her pain was intermittent and has become more constant now she localizes the pain to the front of the hip.  Walking is difficult.  She rates her pain at 3/10.  She characterized the pain as dull and achy.  Bending, squatting, standing, sitting, sleeping, and going up/down stairs is bothersome.  She has tried heat, ice, and over-the-counter medications.  She feels like she walks with a limp.  She denies any radicular pain.      EXAM:  /62 (BP Location: Right arm, Patient Position: Sitting, Cuff Size: Adult Regular)   Pulse 101   Temp 98.3  F (36.8  C) (Temporal)   Resp 16   LMP 07/15/2004 (Approximate)   SpO2 98%   MUSCULOSKELETAL EXAM:  LEFT HIP  Inspection:  -No gross deformity    Tenderness to palpation of the:  -Anterior hip joint: Positive  -ASIS:  Negative  -Greater trochanter:  Negative  -Pelvic ala:  Negative  -Gluteus medius/minimus tendinous insertion:  Negative    Range of Motion:  -Passive flexion:  115 with pain at endrange  -Passive external rotation:  40 with pain at endrange  -Passive internal rotation:  15 with pain at endrange    Strength:  -Knee extension:  5/5  -Knee flexion:  5/5  -Hip abduction:  5/5  -Hip adduction:  5/5  -Hip flexion:  5/5  -Hip extension:  5/5    Special Tests:  -Logrolling maneuver: Positive  -IRENE: Positive anteriorly  -FADIR: Positive  -Scour test: Positive  -Stinchfield test: Positive    Other:  -Intact sensation to light touch distally.  -No signs of cyanosis. Normal skin temperature of the lower extremity.  -Knee/foot/ankle:  No gross deformity. Full range of motion.  -Right hip:  No gross deformity. No palpable tenderness. Normal strength and ROM.      IMAGIN2025: Pelvis and 2 view left hip x-ray  - Sclerosis and cystic changes with flattening of the femoral head and decreased  joint space of the left femoral acetabular joint.  Joint space on the right appears well-preserved.      ASSESSMENT/PLAN:  Diagnoses and all orders for this visit:  Avascular necrosis of bone of left hip (H)  -     XR Pelvis w Hip Left G/E 2 Views  -     Orthopedic  Referral; Future  Primary osteoarthritis of left hip  -     Orthopedic  Referral; Future  Avascular necrosis of bone of right hip (H)  -     Orthopedic  Referral; Future    69-year-old female with morbid advanced age AVN of the left femoral head.  X-rays from 2/26 were personally reviewed in the office with the findings as demonstrated above by my interpretation.  No external factors were identified during today's evaluation which could seemingly be contributing to AVN.  With this finding, I do feel that surgical consultation is warranted.  - Referral placed to orthopedic surgery  - For surgical management, orthopedics to assume care    Right hip:  We did discuss that radiologist interpretation suggests possible early AVN of the right hip.  Currently patient is asymptomatic.  - Monitor symptoms  - Referral to orthopedic surgery      Berhane Chavarria MD  2/26/2025  7:55 AM    Total time spent with this patient was 26 minutes which included chart review, visualization and independent interpretation of images, time spent with the patient, and documentation.    Procedure time:  0 minute(s)

## 2025-03-03 ENCOUNTER — MYC MEDICAL ADVICE (OUTPATIENT)
Dept: FAMILY MEDICINE | Facility: OTHER | Age: 70
End: 2025-03-03
Payer: OTHER GOVERNMENT

## 2025-03-31 ENCOUNTER — PATIENT OUTREACH (OUTPATIENT)
Dept: GASTROENTEROLOGY | Facility: CLINIC | Age: 70
End: 2025-03-31
Payer: OTHER GOVERNMENT

## 2025-03-31 PROBLEM — D12.6 TUBULAR ADENOMA OF COLON: Status: ACTIVE | Noted: 2021-04-01

## 2025-04-01 ENCOUNTER — OFFICE VISIT (OUTPATIENT)
Dept: FAMILY MEDICINE | Facility: OTHER | Age: 70
End: 2025-04-01
Attending: FAMILY MEDICINE
Payer: MEDICARE

## 2025-04-01 VITALS
HEART RATE: 78 BPM | WEIGHT: 119.9 LBS | SYSTOLIC BLOOD PRESSURE: 118 MMHG | BODY MASS INDEX: 22.07 KG/M2 | OXYGEN SATURATION: 97 % | DIASTOLIC BLOOD PRESSURE: 72 MMHG | RESPIRATION RATE: 18 BRPM | HEIGHT: 62 IN | TEMPERATURE: 98.7 F

## 2025-04-01 DIAGNOSIS — Z01.818 PREOPERATIVE EXAMINATION: Primary | ICD-10-CM

## 2025-04-01 DIAGNOSIS — E03.9 HYPOTHYROIDISM, UNSPECIFIED TYPE: ICD-10-CM

## 2025-04-01 DIAGNOSIS — I49.3 PVC (PREMATURE VENTRICULAR CONTRACTION): ICD-10-CM

## 2025-04-01 DIAGNOSIS — D64.9 ANEMIA, UNSPECIFIED TYPE: ICD-10-CM

## 2025-04-01 LAB
ALBUMIN SERPL BCG-MCNC: 4.3 G/DL (ref 3.5–5.2)
ALP SERPL-CCNC: 124 U/L (ref 40–150)
ALT SERPL W P-5'-P-CCNC: 16 U/L (ref 0–50)
ANION GAP SERPL CALCULATED.3IONS-SCNC: 10 MMOL/L (ref 7–15)
AST SERPL W P-5'-P-CCNC: 21 U/L (ref 0–45)
ATRIAL RATE - MUSE: 71 BPM
BASOPHILS # BLD AUTO: 0 10E3/UL (ref 0–0.2)
BASOPHILS NFR BLD AUTO: 1 %
BILIRUB SERPL-MCNC: 0.3 MG/DL
BUN SERPL-MCNC: 14.7 MG/DL (ref 8–23)
CALCIUM SERPL-MCNC: 9.9 MG/DL (ref 8.8–10.4)
CHLORIDE SERPL-SCNC: 105 MMOL/L (ref 98–107)
CREAT SERPL-MCNC: 0.47 MG/DL (ref 0.51–0.95)
DIASTOLIC BLOOD PRESSURE - MUSE: NORMAL MMHG
EGFRCR SERPLBLD CKD-EPI 2021: >90 ML/MIN/1.73M2
EOSINOPHIL # BLD AUTO: 0.1 10E3/UL (ref 0–0.7)
EOSINOPHIL NFR BLD AUTO: 2 %
ERYTHROCYTE [DISTWIDTH] IN BLOOD BY AUTOMATED COUNT: 14.3 % (ref 10–15)
GLUCOSE SERPL-MCNC: 101 MG/DL (ref 70–99)
HCO3 SERPL-SCNC: 27 MMOL/L (ref 22–29)
HCT VFR BLD AUTO: 36.9 % (ref 35–47)
HGB BLD-MCNC: 12.3 G/DL (ref 11.7–15.7)
IMM GRANULOCYTES # BLD: 0 10E3/UL
IMM GRANULOCYTES NFR BLD: 0 %
INTERPRETATION ECG - MUSE: NORMAL
LYMPHOCYTES # BLD AUTO: 0.8 10E3/UL (ref 0.8–5.3)
LYMPHOCYTES NFR BLD AUTO: 21 %
MCH RBC QN AUTO: 30.4 PG (ref 26.5–33)
MCHC RBC AUTO-ENTMCNC: 33.3 G/DL (ref 31.5–36.5)
MCV RBC AUTO: 91 FL (ref 78–100)
MONOCYTES # BLD AUTO: 0.3 10E3/UL (ref 0–1.3)
MONOCYTES NFR BLD AUTO: 8 %
NEUTROPHILS # BLD AUTO: 2.6 10E3/UL (ref 1.6–8.3)
NEUTROPHILS NFR BLD AUTO: 68 %
NRBC # BLD AUTO: 0 10E3/UL
NRBC BLD AUTO-RTO: 0 /100
P AXIS - MUSE: 75 DEGREES
PLATELET # BLD AUTO: 255 10E3/UL (ref 150–450)
POTASSIUM SERPL-SCNC: 3.8 MMOL/L (ref 3.4–5.3)
PR INTERVAL - MUSE: 160 MS
PROT SERPL-MCNC: 7.1 G/DL (ref 6.4–8.3)
QRS DURATION - MUSE: 88 MS
QT - MUSE: 374 MS
QTC - MUSE: 406 MS
R AXIS - MUSE: 54 DEGREES
RBC # BLD AUTO: 4.04 10E6/UL (ref 3.8–5.2)
SODIUM SERPL-SCNC: 142 MMOL/L (ref 135–145)
SYSTOLIC BLOOD PRESSURE - MUSE: NORMAL MMHG
T AXIS - MUSE: 61 DEGREES
VENTRICULAR RATE- MUSE: 71 BPM
WBC # BLD AUTO: 3.8 10E3/UL (ref 4–11)

## 2025-04-01 PROCEDURE — 85014 HEMATOCRIT: CPT | Mod: ZL | Performed by: FAMILY MEDICINE

## 2025-04-01 PROCEDURE — 85004 AUTOMATED DIFF WBC COUNT: CPT | Mod: ZL | Performed by: FAMILY MEDICINE

## 2025-04-01 PROCEDURE — G0463 HOSPITAL OUTPT CLINIC VISIT: HCPCS

## 2025-04-01 PROCEDURE — 93005 ELECTROCARDIOGRAM TRACING: CPT | Performed by: FAMILY MEDICINE

## 2025-04-01 PROCEDURE — 36415 COLL VENOUS BLD VENIPUNCTURE: CPT | Mod: ZL | Performed by: FAMILY MEDICINE

## 2025-04-01 PROCEDURE — 80053 COMPREHEN METABOLIC PANEL: CPT | Mod: ZL | Performed by: FAMILY MEDICINE

## 2025-04-01 RX ORDER — ASPIRIN 81 MG/1
81 TABLET ORAL DAILY
COMMUNITY

## 2025-04-01 ASSESSMENT — PAIN SCALES - GENERAL: PAINLEVEL_OUTOF10: MILD PAIN (2)

## 2025-04-01 NOTE — PROGRESS NOTES
Preoperative Evaluation  [facility]  [address]  [place] [address]  Phone: [phone]  Fax: [phone]  Primary Provider: LILIAN AZAR MD  Pre-op Performing Provider: Spencer Rajput MD  Apr 1, 2025 4/1/2025   Surgical Information   What procedure is being done? Right reverse total shoulder arthroplasty   Facility or Hospital where procedure/surgery will be performed: [facility]   Who is doing the procedure / surgery? dr jeff   Date of surgery / procedure: april 10   Time of surgery / procedure: ?   Where do you plan to recover after surgery? at home with family     Fax number for surgical facility: [phone]    Assessment & Plan     The proposed surgical procedure is considered INTERMEDIATE risk.    Preoperative examination  Patient is approved as a satisfactory candidate for procedure as planned with anesthesia up to and including general anesthesia.  She will be NPO after midnight the night before surgery and will follow-up per the surgeon's post-operative recommendations.  Karla-operative medication instructions as below.  She already stopped her vitamins and supplements.  She will otherwise follow-up with primary care for routine checks or as needed for any other problems.    - CBC with Platelets & Differential; Future  - Comprehensive metabolic panel; Future  - EKG 12-lead, tracing only (Same Day)    Hypothyroidism, unspecified type  On thyroid replacement.  No acute issues.    PVC (premature ventricular contraction)  Previous workup July 2024 with otherwise normal echocardiogram and Zio patch.  No current issues.  - EKG 12-lead, tracing only (Same Day)    Anemia, unspecified type  History of anemia.  Check CBC with the blood work today.  - CBC with Platelets & Differential; Future            - No identified additional risk factors other than previously addressed    Preoperative Medication Instructions  Antiplatelet  or Anticoagulation Medication Instructions   - We reviewed the medication list and the patient is not on an antiplatelet or anticoagulation medications.    Additional Medication Instructions    Recommendation  Approval given to proceed with proposed procedure, without further diagnostic evaluation.    Riley Kiser is a 69 year old, presenting for the following:  Pre-Op Exam          4/1/2025     2:21 PM   Additional Questions   Roomed by Mile Urbina LPN     HPI: 69 year old female who I was asked to see in consultation for pre-operative assessment for proposed procedure as above by Dr. Crump at [facility].  She has had fairly recent issues with bilateral shoulder pain and left hip pain.  She had been quite healthy for many years.  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  She is planning for right shoulder reverse total arthroplasty first.  Then she may consider left shoulder and the left hip replacement as well in the future.  She has followed with orthopedics as well as sports medicine.  It has been refractory to conservative treatment.  She overall is otherwise quite healthy.  She does take thyroid replacement as below.  Otherwise she is just on vitamins and supplements.  She is overall otherwise doing well at this time and has no other current complaints or concerns.  No recent illnesses or other symptoms as below.  No personal bleeding problems or problems with anesthesia in the past.            4/1/2025   Pre-Op Questionnaire   Have you ever had a heart attack or stroke? No   Have you ever had surgery on your heart or blood vessels, such as a stent placement, a coronary artery bypass, or surgery on an artery in your head, neck, heart, or legs? No   Do you have chest pain with activity? No   Do you have a history of heart failure? No   Do you currently have a cold, bronchitis or symptoms of  other infection? No   Do you have a cough, shortness of breath, or wheezing? No   Do you or anyone in your family have previous history of blood clots? No   Do you or does anyone in your family have a serious bleeding problem such as prolonged bleeding following surgeries or cuts? No   Have you ever had problems with anemia or been told to take iron pills? (!) YES -history of anemia; not currently taking anything for that last hemoglobin was 11.0 October 2024.   Have you had any abnormal blood loss such as black, tarry or bloody stools, or abnormal vaginal bleeding? No   Have you ever had a blood transfusion? No   Are you willing to have a blood transfusion if it is medically needed before, during, or after your surgery? Yes   Have you or any of your relatives ever had problems with anesthesia? No   Do you have sleep apnea, excessive snoring or daytime drowsiness? No   Do you have any artifical heart valves or other implanted medical devices like a pacemaker, defibrillator, or continuous glucose monitor? No   Do you have artificial joints? No   Are you allergic to latex? No     Health Care Directive  Patient does not have a Health Care Directive:     Preoperative Review of    reviewed - no record of controlled substances prescribed.          Patient Active Problem List    Diagnosis Date Noted    Tubular adenoma of colon 04/01/2021     Priority: Medium    Osteopenia 2020     Priority: Medium     Dexa 2020      Pain in limb 02/27/2018     Priority: Medium    Hypothyroidism 02/11/2014     Priority: Medium    Hypercholesterolemia 04/20/2011     Priority: Medium    Bursitis 02/09/2011     Priority: Medium    Other specified sites of sprains and strains 09/20/2010     Priority: Medium     Overview:   Lt lower rib area        Past Medical History:   Diagnosis Date    Breast lump     FNA benign breast mass age early 40's    Hypothyroidism     2/11/2014    Osteopenia 2020    Dexa 2020    Personal history of other  "medical treatment (CODE)      SAB x 3 EAB x 1     Past Surgical History:   Procedure Laterality Date    COLONOSCOPY N/A 2021    Procedure: COLONOSCOPY, WITH POLYPECTOMY;  Surgeon: Dominguez Beltran MD;  Location:  OR     Current Outpatient Medications   Medication Sig Dispense Refill    Ascorbic Acid (VITAMIN C) 500 MG/5ML LIQD Take 4.9 mLs by mouth daily.      aspirin 81 MG EC tablet Take 81 mg by mouth daily.      Calcium-Magnesium-Vitamin D 600-300-400 LIQD Take 4.9 mLs by mouth daily.      co-enzyme Q-10 100 MG/5ML LIQD liquid Take 4.9 mLs by mouth daily.      Cod Liver Oil 5000-500 UNIT/5ML OIL Take 5 mLs by mouth daily.      Cyanocobalamin 1000 MCG/ML LIQD Take 4.9 mLs by mouth daily.      NP THYROID 90 MG tablet Take 0.5 tablets (45 mg) by mouth daily. 90 tablet 3    SAFFLOWER OIL-VITAMIN B6 PO Take 4.9 mLs by mouth daily.      VITAMIN D, CHOLECALCIFEROL, PO Take 2,000 Units by mouth daily         No Known Allergies     Social History     Tobacco Use    Smoking status: Never     Passive exposure: Never    Smokeless tobacco: Never   Substance Use Topics    Alcohol use: No     Family History   Problem Relation Age of Onset    Other - See Comments Mother         Lung disease    Hypertension Father         alive age 95 2020    Hyperlipidemia Father         Hyperlipidemia,Hyperlipidemia    Heart Disease Father 86        Heart Disease,MI with stents    Colon Polyps Father     Cerebrovascular Disease Father     Family History Negative Sister         Good Health    Hypertension Brother         Hypertension    Breast Cancer No family hx of         Cancer-breast     History   Drug Use Unknown               Objective    /72 (BP Location: Right arm, Patient Position: Sitting, Cuff Size: Adult Regular)   Pulse 78   Temp 98.7  F (37.1  C) (Tympanic)   Resp 18   Ht 1.575 m (5' 2\")   Wt 54.4 kg (119 lb 14.4 oz)   LMP 07/15/2004 (Approximate)   SpO2 97%   BMI 21.93 kg/m     Estimated body mass " "index is 21.93 kg/m  as calculated from the following:    Height as of this encounter: 1.575 m (5' 2\").    Weight as of this encounter: 54.4 kg (119 lb 14.4 oz).  Physical Exam  GENERAL: alert and no distress  EYES: Eyes grossly normal to inspection, PERRL and conjunctivae and sclerae normal  HENT: ear canals and TM's normal, nose and mouth without ulcers or lesions  NECK: no adenopathy, no asymmetry, masses, or scars  RESP: lungs clear to auscultation - no rales, rhonchi or wheezes  CV: regular rate and rhythm, normal S1 S2, no S3 or S4, no murmur, click or rub, no peripheral edema  ABDOMEN: soft, nontender, no hepatosplenomegaly, no masses and bowel sounds normal  MS: no gross musculoskeletal defects noted, no edema  SKIN: no suspicious lesions or rashes  NEURO: Normal strength and tone, mentation intact and speech normal  PSYCH: mentation appears normal, affect normal/bright    Recent Labs   Lab Test 10/15/24  0904   HGB 11.0*         POTASSIUM 4.1   CR 0.49*        Diagnostics  Recent Results (from the past 24 hours)   EKG 12-lead, tracing only (Same Day)    Collection Time: 04/01/25  3:05 PM   Result Value Ref Range    Systolic Blood Pressure  mmHg    Diastolic Blood Pressure  mmHg    Ventricular Rate 71 BPM    Atrial Rate 71 BPM    IN Interval 160 ms    QRS Duration 88 ms     ms    QTc 406 ms    P Axis 75 degrees    R AXIS 54 degrees    T Axis 61 degrees    Interpretation ECG       Sinus rhythm  Septal infarct (cited on or before 15-Jul-2024)  Abnormal ECG  When compared with ECG of 15-Jul-2024 09:13,  Premature ventricular complexes are no longer Present     Comprehensive metabolic panel    Collection Time: 04/01/25  3:10 PM   Result Value Ref Range    Sodium 142 135 - 145 mmol/L    Potassium 3.8 3.4 - 5.3 mmol/L    Carbon Dioxide (CO2) 27 22 - 29 mmol/L    Anion Gap 10 7 - 15 mmol/L    Urea Nitrogen 14.7 8.0 - 23.0 mg/dL    Creatinine 0.47 (L) 0.51 - 0.95 mg/dL    GFR Estimate >90 >60 " mL/min/1.73m2    Calcium 9.9 8.8 - 10.4 mg/dL    Chloride 105 98 - 107 mmol/L    Glucose 101 (H) 70 - 99 mg/dL    Alkaline Phosphatase 124 40 - 150 U/L    AST 21 0 - 45 U/L    ALT 16 0 - 50 U/L    Protein Total 7.1 6.4 - 8.3 g/dL    Albumin 4.3 3.5 - 5.2 g/dL    Bilirubin Total 0.3 <=1.2 mg/dL   CBC with platelets and differential    Collection Time: 04/01/25  3:10 PM   Result Value Ref Range    WBC Count 3.8 (L) 4.0 - 11.0 10e3/uL    RBC Count 4.04 3.80 - 5.20 10e6/uL    Hemoglobin 12.3 11.7 - 15.7 g/dL    Hematocrit 36.9 35.0 - 47.0 %    MCV 91 78 - 100 fL    MCH 30.4 26.5 - 33.0 pg    MCHC 33.3 31.5 - 36.5 g/dL    RDW 14.3 10.0 - 15.0 %    Platelet Count 255 150 - 450 10e3/uL    % Neutrophils 68 %    % Lymphocytes 21 %    % Monocytes 8 %    % Eosinophils 2 %    % Basophils 1 %    % Immature Granulocytes 0 %    NRBCs per 100 WBC 0 <1 /100    Absolute Neutrophils 2.6 1.6 - 8.3 10e3/uL    Absolute Lymphocytes 0.8 0.8 - 5.3 10e3/uL    Absolute Monocytes 0.3 0.0 - 1.3 10e3/uL    Absolute Eosinophils 0.1 0.0 - 0.7 10e3/uL    Absolute Basophils 0.0 0.0 - 0.2 10e3/uL    Absolute Immature Granulocytes 0.0 <=0.4 10e3/uL    Absolute NRBCs 0.0 10e3/uL      EKG: appears normal, NSR, normal axis, normal intervals, no acute ST/T changes c/w ischemia, no LVH by voltage criteria, unchanged from previous tracings    Revised Cardiac Risk Index (RCRI)  The patient has the following serious cardiovascular risks for perioperative complications:   - No serious cardiac risks = 0 points     RCRI Interpretation: 0 points: Class I (very low risk - 0.4% complication rate)         Signed Electronically by: Spencer Rajput MD  A copy of this evaluation report is provided to the requesting physician.

## 2025-04-01 NOTE — NURSING NOTE
"Chief Complaint   Patient presents with    Pre-Op Exam     Patient presents to the clinic for her pre op exam for total right shoulder. She denies any concerns at this time.     Initial /72 (BP Location: Right arm, Patient Position: Sitting, Cuff Size: Adult Regular)   Pulse 78   Temp 98.7  F (37.1  C) (Tympanic)   Resp 18   Ht 1.575 m (5' 2\")   Wt 54.4 kg (119 lb 14.4 oz)   LMP 07/15/2004 (Approximate)   SpO2 97%   BMI 21.93 kg/m   Estimated body mass index is 21.93 kg/m  as calculated from the following:    Height as of this encounter: 1.575 m (5' 2\").    Weight as of this encounter: 54.4 kg (119 lb 14.4 oz).  Medication Review: complete    The next two questions are to help us understand your food security.  If you are feeling you need any assistance in this area, we have resources available to support you today.          10/15/2024   SDOH- Food Insecurity   Within the past 12 months, did you worry that your food would run out before you got money to buy more? N   Within the past 12 months, did the food you bought just not last and you didn t have money to get more? N         Health Care Directive:  Patient does not have a Health Care Directive: Discussed advance care planning with patient; however, patient declined at this time.    Mile Urbina LPN      "

## 2025-04-01 NOTE — PATIENT INSTRUCTIONS
How to Take Your Medication Before Surgery  Preoperative Medication Instructions   Antiplatelet or Anticoagulation Medication Instructions   - We reviewed the medication list and the patient is not on an antiplatelet or anticoagulation medications.    Additional Medication Instructions       Nothing to eat or drink after midnight the night before surgery.  Take no medications on the morning of surgery.

## 2025-04-14 ENCOUNTER — DOCUMENTATION ONLY (OUTPATIENT)
Dept: OTHER | Facility: CLINIC | Age: 70
End: 2025-04-14
Payer: OTHER GOVERNMENT

## 2025-04-30 ENCOUNTER — TELEPHONE (OUTPATIENT)
Dept: FAMILY MEDICINE | Facility: OTHER | Age: 70
End: 2025-04-30
Payer: OTHER GOVERNMENT

## 2025-04-30 ENCOUNTER — MYC REFILL (OUTPATIENT)
Dept: FAMILY MEDICINE | Facility: OTHER | Age: 70
End: 2025-04-30
Payer: OTHER GOVERNMENT

## 2025-04-30 DIAGNOSIS — E03.9 HYPOTHYROIDISM, UNSPECIFIED TYPE: ICD-10-CM

## 2025-04-30 NOTE — TELEPHONE ENCOUNTER
PCJ-patient states she received a letter from her pharmacy that she was to contact the providers office about her medication refill       Please call and advise    Thank You    Yoli Keene on 4/30/2025 at 2:10 PM

## 2025-04-30 NOTE — TELEPHONE ENCOUNTER
Patient stated her thyroid medication needs a PA done. The pharmacy informed patient they are faxing a PA request to Dr. Kaushik Pierce and it should be marked as urgent and sent back asap. Patient was informed nurses, CMA's, and providers do not complete PA's as we have a separate department that does them. Patient was informed it typically takes a couple weeks to receive a response from insurance. Patient asked what she is suppose to do if she runs out? Writer informed patient she could see how much it costs if she'd pay out-of-pocket. Patient states that isn't an option and she will just go without then.     Agustina Knowles CMA on 4/30/2025 at 2:49 PM

## 2025-05-01 RX ORDER — LEVOTHYROXINE, LIOTHYRONINE 57; 13.5 UG/1; UG/1
0.5 TABLET ORAL DAILY
Qty: 45 TABLET | Refills: 1 | OUTPATIENT
Start: 2025-05-01

## 2025-05-02 ENCOUNTER — MYC MEDICAL ADVICE (OUTPATIENT)
Dept: FAMILY MEDICINE | Facility: OTHER | Age: 70
End: 2025-05-02
Payer: OTHER GOVERNMENT

## 2025-05-02 DIAGNOSIS — E03.9 HYPOTHYROIDISM, UNSPECIFIED TYPE: ICD-10-CM

## 2025-05-08 ENCOUNTER — MYC MEDICAL ADVICE (OUTPATIENT)
Dept: FAMILY MEDICINE | Facility: OTHER | Age: 70
End: 2025-05-08
Payer: OTHER GOVERNMENT

## (undated) DEVICE — SOL WATER 1500ML

## (undated) DEVICE — ENDO BRUSH CHANNEL MASTER CLEANING 2-4.2MM BW-412T

## (undated) DEVICE — ENDO SNARE EXACTO COLD 9MM LOOP 2.4MMX230CM 00711115

## (undated) DEVICE — ENDO TRAP POLYP E-TRAP 00711099

## (undated) DEVICE — ENDO KIT COMPLIANCE DYKENDOCMPLY

## (undated) DEVICE — SUCTION MANIFOLD NEPTUNE 2 SYS 4 PORT 0702-020-000

## (undated) DEVICE — TUBING SUCTION 10'X3/16" N510

## (undated) RX ORDER — PROPOFOL 10 MG/ML
INJECTION, EMULSION INTRAVENOUS
Status: DISPENSED
Start: 2021-04-22

## (undated) RX ORDER — LIDOCAINE HYDROCHLORIDE 20 MG/ML
INJECTION, SOLUTION EPIDURAL; INFILTRATION; INTRACAUDAL; PERINEURAL
Status: DISPENSED
Start: 2021-04-22